# Patient Record
Sex: FEMALE | Race: WHITE | Employment: FULL TIME | URBAN - METROPOLITAN AREA
[De-identification: names, ages, dates, MRNs, and addresses within clinical notes are randomized per-mention and may not be internally consistent; named-entity substitution may affect disease eponyms.]

---

## 2017-03-22 RX ORDER — CIPROFLOXACIN 250 MG/1
250 TABLET, FILM COATED ORAL 2 TIMES DAILY
Qty: 14 TABLET | Refills: 0 | Status: SHIPPED | OUTPATIENT
Start: 2017-03-22 | End: 2017-03-29

## 2017-05-23 ENCOUNTER — TELEPHONE (OUTPATIENT)
Dept: INTERNAL MEDICINE CLINIC | Age: 27
End: 2017-05-23

## 2017-05-23 RX ORDER — AZITHROMYCIN 250 MG/1
TABLET, FILM COATED ORAL
Qty: 1 PACKET | Refills: 0 | Status: SHIPPED | OUTPATIENT
Start: 2017-05-23 | End: 2017-06-02

## 2017-09-08 ENCOUNTER — TELEPHONE (OUTPATIENT)
Dept: INTERNAL MEDICINE CLINIC | Age: 27
End: 2017-09-08

## 2017-09-08 RX ORDER — AZITHROMYCIN 250 MG/1
TABLET, FILM COATED ORAL
Qty: 1 PACKET | Refills: 0 | Status: SHIPPED | OUTPATIENT
Start: 2017-09-08 | End: 2017-09-18

## 2017-09-26 DIAGNOSIS — Z23 NEED FOR PROPHYLACTIC VACCINATION WITH MEASLES-MUMPS-RUBELLA (MMR) VACCINE: Primary | ICD-10-CM

## 2017-10-20 DIAGNOSIS — L65.9 HAIR LOSS: ICD-10-CM

## 2017-10-20 DIAGNOSIS — E07.9 THYROID DISORDER: ICD-10-CM

## 2017-10-20 DIAGNOSIS — Z02.0 SCHOOL PHYSICAL EXAM: Primary | ICD-10-CM

## 2017-10-24 PROCEDURE — 90686 IIV4 VACC NO PRSV 0.5 ML IM: CPT | Performed by: INTERNAL MEDICINE

## 2017-10-24 PROCEDURE — 90472 IMMUNIZATION ADMIN EACH ADD: CPT | Performed by: INTERNAL MEDICINE

## 2017-10-24 PROCEDURE — 90715 TDAP VACCINE 7 YRS/> IM: CPT | Performed by: INTERNAL MEDICINE

## 2017-10-24 PROCEDURE — 90471 IMMUNIZATION ADMIN: CPT | Performed by: INTERNAL MEDICINE

## 2017-10-24 PROCEDURE — 90710 MMRV VACCINE SC: CPT | Performed by: INTERNAL MEDICINE

## 2017-10-26 ENCOUNTER — HOSPITAL ENCOUNTER (OUTPATIENT)
Age: 27
Setting detail: SPECIMEN
Discharge: HOME | End: 2017-10-26
Payer: COMMERCIAL

## 2017-10-26 DIAGNOSIS — L65.9 HAIR LOSS: ICD-10-CM

## 2017-10-26 DIAGNOSIS — Z02.0 SCHOOL PHYSICAL EXAM: ICD-10-CM

## 2017-10-26 DIAGNOSIS — E07.9 THYROID DISORDER: ICD-10-CM

## 2017-10-26 LAB
ABSOLUTE EOS #: 0.2 K/UL (ref 0–0.4)
ABSOLUTE IMMATURE GRANULOCYTE: NORMAL K/UL (ref 0–0.3)
ABSOLUTE LYMPH #: 1.9 K/UL (ref 1–4.8)
ABSOLUTE MONO #: 1.2 K/UL (ref 0.1–1.2)
BASOPHILS # BLD: 0 %
BASOPHILS ABSOLUTE: 0 K/UL (ref 0–0.2)
DIFFERENTIAL TYPE: NORMAL
EOSINOPHILS RELATIVE PERCENT: 2 %
HCT VFR BLD CALC: 41.9 % (ref 36–46)
HEMOGLOBIN: 14 G/DL (ref 12–16)
HEPATITIS B SURFACE ANTIGEN: NONREACTIVE
IMMATURE GRANULOCYTES: NORMAL %
LYMPHOCYTES # BLD: 19 %
MCH RBC QN AUTO: 31.3 PG (ref 26–34)
MCHC RBC AUTO-ENTMCNC: 33.3 G/DL (ref 31–37)
MCV RBC AUTO: 94 FL (ref 80–100)
MONOCYTES # BLD: 12 %
PDW BLD-RTO: 13.4 % (ref 12.5–15.4)
PLATELET # BLD: 295 K/UL (ref 140–450)
PLATELET ESTIMATE: NORMAL
PMV BLD AUTO: 10.2 FL (ref 6–12)
RBC # BLD: 4.46 M/UL (ref 4–5.2)
RBC # BLD: NORMAL 10*6/UL
SEG NEUTROPHILS: 67 %
SEGMENTED NEUTROPHILS ABSOLUTE COUNT: 6.6 K/UL (ref 1.8–7.7)
TSH SERPL DL<=0.05 MIU/L-ACNC: 1.05 MIU/L (ref 0.3–5)
WBC # BLD: 9.9 K/UL (ref 3.5–11)
WBC # BLD: NORMAL 10*3/UL

## 2017-10-27 DIAGNOSIS — Z23 NEED FOR HEPATITIS VACCINATION: ICD-10-CM

## 2017-10-27 DIAGNOSIS — Z02.0 SCHOOL PHYSICAL EXAM: Primary | ICD-10-CM

## 2017-10-27 PROCEDURE — 90471 IMMUNIZATION ADMIN: CPT | Performed by: INTERNAL MEDICINE

## 2017-10-27 PROCEDURE — 90746 HEPB VACCINE 3 DOSE ADULT IM: CPT | Performed by: INTERNAL MEDICINE

## 2017-12-04 PROCEDURE — 90471 IMMUNIZATION ADMIN: CPT | Performed by: INTERNAL MEDICINE

## 2017-12-04 PROCEDURE — 90746 HEPB VACCINE 3 DOSE ADULT IM: CPT | Performed by: INTERNAL MEDICINE

## 2018-01-09 RX ORDER — OSELTAMIVIR PHOSPHATE 75 MG/1
75 CAPSULE ORAL DAILY
Qty: 10 CAPSULE | Refills: 0 | Status: SHIPPED | OUTPATIENT
Start: 2018-01-09 | End: 2018-01-19

## 2018-01-17 ENCOUNTER — TELEPHONE (OUTPATIENT)
Dept: INTERNAL MEDICINE CLINIC | Age: 28
End: 2018-01-17

## 2018-01-17 RX ORDER — PERMETHRIN 50 MG/G
CREAM TOPICAL
Qty: 1 TUBE | Refills: 0 | Status: SHIPPED | OUTPATIENT
Start: 2018-01-17 | End: 2018-06-06

## 2018-01-25 RX ORDER — MULTIVIT 47/IRON/FOLATE 1/DHA 27-1-300MG
1 CAPSULE ORAL DAILY
Qty: 90 CAPSULE | Refills: 3 | Status: SHIPPED | OUTPATIENT
Start: 2018-01-25 | End: 2018-06-06

## 2018-02-22 DIAGNOSIS — R41.840 DIFFICULTY CONCENTRATING: Primary | ICD-10-CM

## 2018-05-25 ENCOUNTER — HOSPITAL ENCOUNTER (EMERGENCY)
Age: 28
Discharge: HOME | End: 2018-05-25
Attending: EMERGENCY MEDICINE
Payer: COMMERCIAL

## 2018-05-25 ENCOUNTER — APPOINTMENT (OUTPATIENT)
Dept: ULTRASOUND IMAGING | Age: 28
End: 2018-05-25
Payer: COMMERCIAL

## 2018-05-25 ENCOUNTER — APPOINTMENT (OUTPATIENT)
Dept: CT IMAGING | Age: 28
End: 2018-05-25
Payer: COMMERCIAL

## 2018-05-25 VITALS
DIASTOLIC BLOOD PRESSURE: 64 MMHG | SYSTOLIC BLOOD PRESSURE: 120 MMHG | BODY MASS INDEX: 33.42 KG/M2 | HEART RATE: 88 BPM | WEIGHT: 177 LBS | HEIGHT: 61 IN | TEMPERATURE: 98.1 F | RESPIRATION RATE: 16 BRPM | OXYGEN SATURATION: 99 %

## 2018-05-25 DIAGNOSIS — N83.201 CYST OF RIGHT OVARY: Primary | ICD-10-CM

## 2018-05-25 DIAGNOSIS — R10.2 FEMALE PELVIC PAIN: ICD-10-CM

## 2018-05-25 DIAGNOSIS — R52 PAIN: ICD-10-CM

## 2018-05-25 LAB
ABSOLUTE EOS #: 0.2 K/UL (ref 0–0.4)
ABSOLUTE IMMATURE GRANULOCYTE: ABNORMAL K/UL (ref 0–0.3)
ABSOLUTE LYMPH #: 1.6 K/UL (ref 1–4.8)
ABSOLUTE MONO #: 1 K/UL (ref 0.1–1.3)
ANION GAP SERPL CALCULATED.3IONS-SCNC: 18 MMOL/L (ref 9–17)
BASOPHILS # BLD: 0 % (ref 0–2)
BASOPHILS ABSOLUTE: 0 K/UL (ref 0–0.2)
BILIRUBIN URINE: NEGATIVE
BUN BLDV-MCNC: 9 MG/DL (ref 6–20)
BUN/CREAT BLD: ABNORMAL (ref 9–20)
CALCIUM SERPL-MCNC: 9 MG/DL (ref 8.6–10.4)
CHLORIDE BLD-SCNC: 99 MMOL/L (ref 98–107)
CO2: 23 MMOL/L (ref 20–31)
COLOR: YELLOW
COMMENT UA: NORMAL
CREAT SERPL-MCNC: 0.76 MG/DL (ref 0.5–0.9)
DIFFERENTIAL TYPE: ABNORMAL
DIRECT EXAM: NORMAL
EOSINOPHILS RELATIVE PERCENT: 2 % (ref 0–4)
GFR AFRICAN AMERICAN: >60 ML/MIN
GFR NON-AFRICAN AMERICAN: >60 ML/MIN
GFR SERPL CREATININE-BSD FRML MDRD: ABNORMAL ML/MIN/{1.73_M2}
GFR SERPL CREATININE-BSD FRML MDRD: ABNORMAL ML/MIN/{1.73_M2}
GLUCOSE BLD-MCNC: 95 MG/DL (ref 70–99)
GLUCOSE URINE: NEGATIVE
HCG(URINE) PREGNANCY TEST: NEGATIVE
HCT VFR BLD CALC: 46.3 % (ref 36–46)
HEMOGLOBIN: 16 G/DL (ref 12–16)
IMMATURE GRANULOCYTES: ABNORMAL %
KETONES, URINE: NEGATIVE
LEUKOCYTE ESTERASE, URINE: NEGATIVE
LYMPHOCYTES # BLD: 15 % (ref 24–44)
Lab: NORMAL
MCH RBC QN AUTO: 33.1 PG (ref 26–34)
MCHC RBC AUTO-ENTMCNC: 34.6 G/DL (ref 31–37)
MCV RBC AUTO: 95.5 FL (ref 80–100)
MONOCYTES # BLD: 10 % (ref 1–7)
NITRITE, URINE: NEGATIVE
NRBC AUTOMATED: ABNORMAL PER 100 WBC
PDW BLD-RTO: 12.8 % (ref 11.5–14.9)
PH UA: 6 (ref 5–8)
PLATELET # BLD: 232 K/UL (ref 150–450)
PLATELET ESTIMATE: ABNORMAL
PMV BLD AUTO: 10.3 FL (ref 6–12)
POTASSIUM SERPL-SCNC: 3.7 MMOL/L (ref 3.7–5.3)
PROTEIN UA: NEGATIVE
RBC # BLD: 4.85 M/UL (ref 4–5.2)
RBC # BLD: ABNORMAL 10*6/UL
SEG NEUTROPHILS: 73 % (ref 36–66)
SEGMENTED NEUTROPHILS ABSOLUTE COUNT: 7.7 K/UL (ref 1.3–9.1)
SODIUM BLD-SCNC: 140 MMOL/L (ref 135–144)
SPECIFIC GRAVITY UA: 1 (ref 1–1.03)
SPECIMEN DESCRIPTION: NORMAL
SPECIMEN DESCRIPTION: NORMAL
STATUS: NORMAL
TURBIDITY: CLEAR
URINE HGB: NEGATIVE
UROBILINOGEN, URINE: NORMAL
WBC # BLD: 10.5 K/UL (ref 3.5–11)
WBC # BLD: ABNORMAL 10*3/UL

## 2018-05-25 PROCEDURE — 74176 CT ABD & PELVIS W/O CONTRAST: CPT

## 2018-05-25 PROCEDURE — 6360000002 HC RX W HCPCS: Performed by: EMERGENCY MEDICINE

## 2018-05-25 PROCEDURE — 87491 CHLMYD TRACH DNA AMP PROBE: CPT

## 2018-05-25 PROCEDURE — 96374 THER/PROPH/DIAG INJ IV PUSH: CPT

## 2018-05-25 PROCEDURE — 81003 URINALYSIS AUTO W/O SCOPE: CPT

## 2018-05-25 PROCEDURE — 87660 TRICHOMONAS VAGIN DIR PROBE: CPT

## 2018-05-25 PROCEDURE — 36415 COLL VENOUS BLD VENIPUNCTURE: CPT

## 2018-05-25 PROCEDURE — 99284 EMERGENCY DEPT VISIT MOD MDM: CPT

## 2018-05-25 PROCEDURE — 2580000003 HC RX 258: Performed by: EMERGENCY MEDICINE

## 2018-05-25 PROCEDURE — 87480 CANDIDA DNA DIR PROBE: CPT

## 2018-05-25 PROCEDURE — 84703 CHORIONIC GONADOTROPIN ASSAY: CPT

## 2018-05-25 PROCEDURE — 76830 TRANSVAGINAL US NON-OB: CPT

## 2018-05-25 PROCEDURE — 85025 COMPLETE CBC W/AUTO DIFF WBC: CPT

## 2018-05-25 PROCEDURE — 87591 N.GONORRHOEAE DNA AMP PROB: CPT

## 2018-05-25 PROCEDURE — 80048 BASIC METABOLIC PNL TOTAL CA: CPT

## 2018-05-25 PROCEDURE — 87510 GARDNER VAG DNA DIR PROBE: CPT

## 2018-05-25 PROCEDURE — 93976 VASCULAR STUDY: CPT

## 2018-05-25 RX ORDER — IBUPROFEN 600 MG/1
600 TABLET ORAL EVERY 6 HOURS PRN
Qty: 120 TABLET | Refills: 0 | Status: SHIPPED | OUTPATIENT
Start: 2018-05-25 | End: 2018-08-28

## 2018-05-25 RX ORDER — KETOROLAC TROMETHAMINE 30 MG/ML
30 INJECTION, SOLUTION INTRAMUSCULAR; INTRAVENOUS ONCE
Status: COMPLETED | OUTPATIENT
Start: 2018-05-25 | End: 2018-05-25

## 2018-05-25 RX ORDER — ACETAMINOPHEN AND CODEINE PHOSPHATE 300; 30 MG/1; MG/1
1 TABLET ORAL EVERY 4 HOURS PRN
Qty: 18 TABLET | Refills: 0 | Status: SHIPPED | OUTPATIENT
Start: 2018-05-25 | End: 2018-05-28

## 2018-05-25 RX ORDER — 0.9 % SODIUM CHLORIDE 0.9 %
1000 INTRAVENOUS SOLUTION INTRAVENOUS ONCE
Status: COMPLETED | OUTPATIENT
Start: 2018-05-25 | End: 2018-05-25

## 2018-05-25 RX ADMIN — KETOROLAC TROMETHAMINE 30 MG: 30 INJECTION, SOLUTION INTRAMUSCULAR; INTRAVENOUS at 06:34

## 2018-05-25 RX ADMIN — SODIUM CHLORIDE 1000 ML: 9 INJECTION, SOLUTION INTRAVENOUS at 03:05

## 2018-05-25 ASSESSMENT — PAIN SCALES - GENERAL
PAINLEVEL_OUTOF10: 10
PAINLEVEL_OUTOF10: 10

## 2018-05-29 LAB
C TRACH DNA GENITAL QL NAA+PROBE: NEGATIVE
N. GONORRHOEAE DNA: NEGATIVE

## 2018-06-04 ENCOUNTER — OFFICE VISIT (OUTPATIENT)
Dept: OBGYN CLINIC | Age: 28
End: 2018-06-04
Payer: COMMERCIAL

## 2018-06-04 VITALS
SYSTOLIC BLOOD PRESSURE: 115 MMHG | WEIGHT: 178 LBS | HEIGHT: 61 IN | BODY MASS INDEX: 33.61 KG/M2 | DIASTOLIC BLOOD PRESSURE: 83 MMHG | HEART RATE: 84 BPM

## 2018-06-04 DIAGNOSIS — N83.201 RIGHT OVARIAN CYST: Primary | ICD-10-CM

## 2018-06-04 PROCEDURE — 99212 OFFICE O/P EST SF 10 MIN: CPT | Performed by: SPECIALIST

## 2018-06-04 ASSESSMENT — ENCOUNTER SYMPTOMS
ABDOMINAL PAIN: 0
DIARRHEA: 0
CONSTIPATION: 0
APNEA: 0
COUGH: 0
VOMITING: 0
ABDOMINAL DISTENTION: 0
EYE PAIN: 0
NAUSEA: 0

## 2018-06-06 ENCOUNTER — HOSPITAL ENCOUNTER (OUTPATIENT)
Age: 28
Setting detail: SPECIMEN
Discharge: HOME | End: 2018-06-06
Payer: COMMERCIAL

## 2018-06-06 DIAGNOSIS — Z13.6 SCREENING FOR CARDIOVASCULAR CONDITION: ICD-10-CM

## 2018-06-06 DIAGNOSIS — K76.0 FATTY LIVER: ICD-10-CM

## 2018-06-06 PROBLEM — N83.201 CYST OF RIGHT OVARY: Status: ACTIVE | Noted: 2018-06-06

## 2018-06-06 LAB
ALBUMIN SERPL-MCNC: 3.9 G/DL (ref 3.5–5.2)
ALBUMIN/GLOBULIN RATIO: 1.4 (ref 1–2.5)
ALP BLD-CCNC: 70 U/L (ref 35–104)
ALT SERPL-CCNC: 65 U/L (ref 5–33)
AST SERPL-CCNC: 43 U/L
BILIRUB SERPL-MCNC: 0.65 MG/DL (ref 0.3–1.2)
BILIRUBIN DIRECT: 0.18 MG/DL
BILIRUBIN, INDIRECT: 0.47 MG/DL (ref 0–1)
CHOLESTEROL/HDL RATIO: 2.6
CHOLESTEROL: 157 MG/DL
GLOBULIN: ABNORMAL G/DL (ref 1.5–3.8)
HDLC SERPL-MCNC: 61 MG/DL
LDL CHOLESTEROL: 79 MG/DL (ref 0–130)
TOTAL PROTEIN: 6.6 G/DL (ref 6.4–8.3)
TRIGL SERPL-MCNC: 83 MG/DL
VLDLC SERPL CALC-MCNC: NORMAL MG/DL (ref 1–30)

## 2018-06-16 ENCOUNTER — HOSPITAL ENCOUNTER (OUTPATIENT)
Dept: ULTRASOUND IMAGING | Age: 28
Discharge: HOME | End: 2018-06-18
Payer: COMMERCIAL

## 2018-06-16 DIAGNOSIS — N28.9 RENAL LESION: ICD-10-CM

## 2018-06-16 DIAGNOSIS — K76.0 FATTY LIVER: ICD-10-CM

## 2018-06-16 PROCEDURE — 76770 US EXAM ABDO BACK WALL COMP: CPT

## 2018-06-16 PROCEDURE — 76705 ECHO EXAM OF ABDOMEN: CPT

## 2018-07-22 DIAGNOSIS — R10.9 ABDOMINAL CRAMPS: Primary | ICD-10-CM

## 2018-07-22 RX ORDER — DICYCLOMINE HCL 20 MG
20 TABLET ORAL 3 TIMES DAILY PRN
Qty: 30 TABLET | Refills: 1 | Status: SHIPPED | OUTPATIENT
Start: 2018-07-22 | End: 2018-10-18

## 2018-10-10 ENCOUNTER — HOSPITAL ENCOUNTER (OUTPATIENT)
Age: 28
Setting detail: SPECIMEN
Discharge: HOME | End: 2018-10-10
Payer: COMMERCIAL

## 2018-10-10 DIAGNOSIS — Z11.1 SCREENING-PULMONARY TB: ICD-10-CM

## 2018-10-10 DIAGNOSIS — R79.89 ELEVATED LFTS: ICD-10-CM

## 2018-10-10 LAB
ALBUMIN SERPL-MCNC: 4.3 G/DL (ref 3.5–5.2)
ALBUMIN/GLOBULIN RATIO: 1.4 (ref 1–2.5)
ALP BLD-CCNC: 71 U/L (ref 35–104)
ALT SERPL-CCNC: 87 U/L (ref 5–33)
AST SERPL-CCNC: 104 U/L
BILIRUB SERPL-MCNC: 0.25 MG/DL (ref 0.3–1.2)
BILIRUBIN DIRECT: 0.1 MG/DL
BILIRUBIN, INDIRECT: 0.15 MG/DL (ref 0–1)
GLOBULIN: ABNORMAL G/DL (ref 1.5–3.8)
IRON: 79 UG/DL (ref 37–145)
TOTAL PROTEIN: 7.3 G/DL (ref 6.4–8.3)

## 2018-10-15 LAB — T-SPOT TB TEST: NORMAL

## 2018-10-26 ENCOUNTER — TELEPHONE (OUTPATIENT)
Dept: INTERNAL MEDICINE CLINIC | Age: 28
End: 2018-10-26

## 2018-10-26 DIAGNOSIS — Z23 NEED FOR VACCINATION: Primary | ICD-10-CM

## 2018-11-05 ENCOUNTER — TELEPHONE (OUTPATIENT)
Dept: GASTROENTEROLOGY | Age: 28
End: 2018-11-05

## 2018-11-20 ENCOUNTER — TELEPHONE (OUTPATIENT)
Dept: OBGYN CLINIC | Age: 28
End: 2018-11-20

## 2018-11-21 RX ORDER — METRONIDAZOLE 500 MG/1
500 TABLET ORAL 2 TIMES DAILY
Qty: 14 TABLET | Refills: 0 | Status: SHIPPED | OUTPATIENT
Start: 2018-11-21 | End: 2018-11-28

## 2018-11-21 RX ORDER — FLUCONAZOLE 100 MG/1
100 TABLET ORAL DAILY
Qty: 7 TABLET | Refills: 0 | Status: SHIPPED | OUTPATIENT
Start: 2018-11-21 | End: 2018-11-28

## 2019-01-17 ENCOUNTER — OFFICE VISIT (OUTPATIENT)
Dept: GASTROENTEROLOGY | Age: 29
End: 2019-01-17
Payer: COMMERCIAL

## 2019-01-17 ENCOUNTER — TELEPHONE (OUTPATIENT)
Dept: GASTROENTEROLOGY | Age: 29
End: 2019-01-17

## 2019-01-17 VITALS
WEIGHT: 151 LBS | DIASTOLIC BLOOD PRESSURE: 62 MMHG | SYSTOLIC BLOOD PRESSURE: 108 MMHG | BODY MASS INDEX: 28.55 KG/M2 | HEART RATE: 96 BPM

## 2019-01-17 DIAGNOSIS — R79.89 ABNORMAL LFTS: Primary | ICD-10-CM

## 2019-01-17 PROCEDURE — 99203 OFFICE O/P NEW LOW 30 MIN: CPT | Performed by: INTERNAL MEDICINE

## 2019-01-17 ASSESSMENT — ENCOUNTER SYMPTOMS
EYES NEGATIVE: 1
RESPIRATORY NEGATIVE: 1
GASTROINTESTINAL NEGATIVE: 1
ALLERGIC/IMMUNOLOGIC NEGATIVE: 1

## 2019-01-21 ENCOUNTER — OFFICE VISIT (OUTPATIENT)
Dept: OBGYN CLINIC | Age: 29
End: 2019-01-21
Payer: COMMERCIAL

## 2019-01-21 ENCOUNTER — HOSPITAL ENCOUNTER (OUTPATIENT)
Age: 29
Setting detail: SPECIMEN
Discharge: HOME | End: 2019-01-21
Payer: COMMERCIAL

## 2019-01-21 VITALS
DIASTOLIC BLOOD PRESSURE: 72 MMHG | HEIGHT: 62 IN | SYSTOLIC BLOOD PRESSURE: 122 MMHG | HEART RATE: 106 BPM | RESPIRATION RATE: 18 BRPM | WEIGHT: 148 LBS | BODY MASS INDEX: 27.23 KG/M2

## 2019-01-21 DIAGNOSIS — N76.0 ACUTE VAGINITIS: ICD-10-CM

## 2019-01-21 DIAGNOSIS — Z01.419 WELL WOMAN EXAM: Primary | ICD-10-CM

## 2019-01-21 DIAGNOSIS — Z01.419 WELL WOMAN EXAM: ICD-10-CM

## 2019-01-21 PROCEDURE — 99395 PREV VISIT EST AGE 18-39: CPT | Performed by: SPECIALIST

## 2019-01-21 RX ORDER — FLUCONAZOLE 150 MG/1
150 TABLET ORAL ONCE
Qty: 1 TABLET | Refills: 1 | Status: SHIPPED | OUTPATIENT
Start: 2019-01-21 | End: 2019-01-21

## 2019-01-21 RX ORDER — METRONIDAZOLE 500 MG/1
2000 TABLET ORAL ONCE
Qty: 4 TABLET | Refills: 1 | Status: SHIPPED | OUTPATIENT
Start: 2019-01-21 | End: 2019-01-21

## 2019-01-21 ASSESSMENT — ENCOUNTER SYMPTOMS
EYE PAIN: 0
ABDOMINAL PAIN: 0
NAUSEA: 0
APNEA: 0
COUGH: 0
CONSTIPATION: 0
DIARRHEA: 0
VOMITING: 0
ABDOMINAL DISTENTION: 0

## 2019-01-22 LAB
C TRACH DNA GENITAL QL NAA+PROBE: NEGATIVE
HPV SAMPLE: NORMAL
HPV SOURCE: NORMAL
HPV, GENOTYPE 16: NOT DETECTED
HPV, GENOTYPE 18: NOT DETECTED
HPV, HIGH RISK OTHER: NOT DETECTED
HPV, INTERPRETATION: NORMAL
N. GONORRHOEAE DNA: NEGATIVE

## 2019-02-04 LAB — CYTOLOGY REPORT: NORMAL

## 2019-02-13 ENCOUNTER — HOSPITAL ENCOUNTER (OUTPATIENT)
Age: 29
Discharge: HOME | End: 2019-02-13
Payer: COMMERCIAL

## 2019-02-13 DIAGNOSIS — R79.89 ABNORMAL LFTS: ICD-10-CM

## 2019-02-13 LAB
ABSOLUTE EOS #: 0.21 K/UL (ref 0–0.44)
ABSOLUTE IMMATURE GRANULOCYTE: 0.03 K/UL (ref 0–0.3)
ABSOLUTE LYMPH #: 1.97 K/UL (ref 1.1–3.7)
ABSOLUTE MONO #: 0.81 K/UL (ref 0.1–1.2)
AFP: 2.6 UG/L
ALBUMIN SERPL-MCNC: 4.5 G/DL (ref 3.5–5.2)
ALBUMIN/GLOBULIN RATIO: 1.6 (ref 1–2.5)
ALP BLD-CCNC: 57 U/L (ref 35–104)
ALPHA-1 ANTITRYPSIN: 129 MG/DL (ref 90–200)
ALT SERPL-CCNC: 16 U/L (ref 5–33)
ANION GAP SERPL CALCULATED.3IONS-SCNC: 11 MMOL/L (ref 9–17)
AST SERPL-CCNC: 18 U/L
BASOPHILS # BLD: 1 % (ref 0–2)
BASOPHILS ABSOLUTE: 0.04 K/UL (ref 0–0.2)
BILIRUB SERPL-MCNC: 0.43 MG/DL (ref 0.3–1.2)
BILIRUBIN DIRECT: 0.12 MG/DL
BILIRUBIN, INDIRECT: 0.31 MG/DL (ref 0–1)
BUN BLDV-MCNC: 9 MG/DL (ref 6–20)
BUN/CREAT BLD: NORMAL (ref 9–20)
CALCIUM SERPL-MCNC: 9.3 MG/DL (ref 8.6–10.4)
CERULOPLASMIN: 25 MG/DL (ref 16–45)
CHLORIDE BLD-SCNC: 102 MMOL/L (ref 98–107)
CO2: 26 MMOL/L (ref 20–31)
CREAT SERPL-MCNC: 0.66 MG/DL (ref 0.5–0.9)
DIFFERENTIAL TYPE: NORMAL
EOSINOPHILS RELATIVE PERCENT: 3 % (ref 1–4)
FOLATE: 8.8 NG/ML
GFR AFRICAN AMERICAN: >60 ML/MIN
GFR NON-AFRICAN AMERICAN: >60 ML/MIN
GFR SERPL CREATININE-BSD FRML MDRD: NORMAL ML/MIN/{1.73_M2}
GFR SERPL CREATININE-BSD FRML MDRD: NORMAL ML/MIN/{1.73_M2}
GLOBULIN: NORMAL G/DL (ref 1.5–3.8)
GLUCOSE BLD-MCNC: 82 MG/DL (ref 70–99)
HBV SURFACE AB TITR SER: <3.5 MIU/ML
HCT VFR BLD CALC: 43 % (ref 36.3–47.1)
HEMOGLOBIN: 14.5 G/DL (ref 11.9–15.1)
HEPATITIS C ANTIBODY: NONREACTIVE
IMMATURE GRANULOCYTES: 0 %
IRON SATURATION: 23 % (ref 20–55)
IRON: 77 UG/DL (ref 37–145)
LYMPHOCYTES # BLD: 24 % (ref 24–43)
MCH RBC QN AUTO: 33.3 PG (ref 25.2–33.5)
MCHC RBC AUTO-ENTMCNC: 33.7 G/DL (ref 28.4–34.8)
MCV RBC AUTO: 98.6 FL (ref 82.6–102.9)
MONOCYTES # BLD: 10 % (ref 3–12)
NRBC AUTOMATED: 0 PER 100 WBC
PDW BLD-RTO: 12.4 % (ref 11.8–14.4)
PLATELET # BLD: 290 K/UL (ref 138–453)
PLATELET ESTIMATE: NORMAL
PMV BLD AUTO: 11.8 FL (ref 8.1–13.5)
POTASSIUM SERPL-SCNC: 4 MMOL/L (ref 3.7–5.3)
RBC # BLD: 4.36 M/UL (ref 3.95–5.11)
RBC # BLD: NORMAL 10*6/UL
SEG NEUTROPHILS: 62 % (ref 36–65)
SEGMENTED NEUTROPHILS ABSOLUTE COUNT: 5.24 K/UL (ref 1.5–8.1)
SODIUM BLD-SCNC: 139 MMOL/L (ref 135–144)
TOTAL IRON BINDING CAPACITY: 331 UG/DL (ref 250–450)
TOTAL PROTEIN: 7.3 G/DL (ref 6.4–8.3)
TSH SERPL DL<=0.05 MIU/L-ACNC: 0.86 MIU/L (ref 0.3–5)
UNSATURATED IRON BINDING CAPACITY: 254 UG/DL (ref 112–347)
WBC # BLD: 8.3 K/UL (ref 3.5–11.3)
WBC # BLD: NORMAL 10*3/UL

## 2019-02-13 PROCEDURE — 86317 IMMUNOASSAY INFECTIOUS AGENT: CPT

## 2019-02-13 PROCEDURE — 83520 IMMUNOASSAY QUANT NOS NONAB: CPT

## 2019-02-13 PROCEDURE — 85025 COMPLETE CBC W/AUTO DIFF WBC: CPT

## 2019-02-13 PROCEDURE — 82103 ALPHA-1-ANTITRYPSIN TOTAL: CPT

## 2019-02-13 PROCEDURE — 81479 UNLISTED MOLECULAR PATHOLOGY: CPT

## 2019-02-13 PROCEDURE — 83516 IMMUNOASSAY NONANTIBODY: CPT

## 2019-02-13 PROCEDURE — 82390 ASSAY OF CERULOPLASMIN: CPT

## 2019-02-13 PROCEDURE — 82397 CHEMILUMINESCENT ASSAY: CPT

## 2019-02-13 PROCEDURE — 86140 C-REACTIVE PROTEIN: CPT

## 2019-02-13 PROCEDURE — 82105 ALPHA-FETOPROTEIN SERUM: CPT

## 2019-02-13 PROCEDURE — 86038 ANTINUCLEAR ANTIBODIES: CPT

## 2019-02-13 PROCEDURE — 82787 IGG 1 2 3 OR 4 EACH: CPT

## 2019-02-13 PROCEDURE — 83540 ASSAY OF IRON: CPT

## 2019-02-13 PROCEDURE — 88346 IMFLUOR 1ST 1ANTB STAIN PX: CPT

## 2019-02-13 PROCEDURE — 80048 BASIC METABOLIC PNL TOTAL CA: CPT

## 2019-02-13 PROCEDURE — 86376 MICROSOMAL ANTIBODY EACH: CPT

## 2019-02-13 PROCEDURE — 86255 FLUORESCENT ANTIBODY SCREEN: CPT

## 2019-02-13 PROCEDURE — 80076 HEPATIC FUNCTION PANEL: CPT

## 2019-02-13 PROCEDURE — 83550 IRON BINDING TEST: CPT

## 2019-02-13 PROCEDURE — 84443 ASSAY THYROID STIM HORMONE: CPT

## 2019-02-13 PROCEDURE — 88350 IMFLUOR EA ADDL 1ANTB STN PX: CPT

## 2019-02-13 PROCEDURE — 82746 ASSAY OF FOLIC ACID SERUM: CPT

## 2019-02-13 PROCEDURE — 86803 HEPATITIS C AB TEST: CPT

## 2019-02-13 PROCEDURE — 36415 COLL VENOUS BLD VENIPUNCTURE: CPT

## 2019-02-14 LAB
ANTI-NUCLEAR ANTIBODY (ANA): NEGATIVE
TISSUE TRANSGLUTAMINASE IGA: 0.9 U/ML

## 2019-02-15 ENCOUNTER — OFFICE VISIT (OUTPATIENT)
Dept: GASTROENTEROLOGY | Age: 29
End: 2019-02-15
Payer: COMMERCIAL

## 2019-02-15 VITALS
SYSTOLIC BLOOD PRESSURE: 122 MMHG | WEIGHT: 153.4 LBS | BODY MASS INDEX: 28.52 KG/M2 | DIASTOLIC BLOOD PRESSURE: 79 MMHG | HEART RATE: 90 BPM

## 2019-02-15 DIAGNOSIS — R79.89 ABNORMAL LFTS: Primary | ICD-10-CM

## 2019-02-15 LAB — SMOOTH MUSCLE ANTIBODY: NORMAL

## 2019-02-15 PROCEDURE — 99213 OFFICE O/P EST LOW 20 MIN: CPT | Performed by: INTERNAL MEDICINE

## 2019-02-16 LAB
IGG 1: 339 MG/DL (ref 240–1118)
IGG 2: 354 MG/DL (ref 124–549)
IGG 3: 86 MG/DL (ref 21–134)
IGG 4: 53 MG/DL (ref 1–123)
LIVER-KIDNEY MICROSOMAL AB: NORMAL
MITOCHONDRIAL ANTIBODY: 4.2 UNITS (ref 0–20)

## 2019-02-20 LAB — IBD PANEL: NORMAL

## 2019-11-04 ENCOUNTER — HOSPITAL ENCOUNTER (OUTPATIENT)
Age: 29
Setting detail: SPECIMEN
Discharge: HOME | End: 2019-11-04
Payer: COMMERCIAL

## 2019-11-04 DIAGNOSIS — L65.9 HAIR THINNING: ICD-10-CM

## 2019-11-04 DIAGNOSIS — Z11.1 TUBERCULOSIS SCREENING: ICD-10-CM

## 2019-11-04 DIAGNOSIS — R61 NIGHT SWEATS: ICD-10-CM

## 2019-11-04 LAB
T3 TOTAL: 128 NG/DL (ref 80–200)
THYROXINE, FREE: 1.06 NG/DL (ref 0.93–1.7)
TSH SERPL DL<=0.05 MIU/L-ACNC: 0.95 MIU/L (ref 0.3–5)

## 2019-11-07 LAB — T-SPOT TB TEST: NORMAL

## 2019-12-26 ENCOUNTER — HOSPITAL ENCOUNTER (OUTPATIENT)
Age: 29
Setting detail: SPECIMEN
Discharge: HOME | End: 2019-12-26
Payer: COMMERCIAL

## 2019-12-26 ENCOUNTER — OFFICE VISIT (OUTPATIENT)
Dept: OBGYN CLINIC | Age: 29
End: 2019-12-26
Payer: COMMERCIAL

## 2019-12-26 VITALS
WEIGHT: 173.6 LBS | BODY MASS INDEX: 32.77 KG/M2 | DIASTOLIC BLOOD PRESSURE: 81 MMHG | SYSTOLIC BLOOD PRESSURE: 122 MMHG | HEART RATE: 83 BPM | RESPIRATION RATE: 18 BRPM | HEIGHT: 61 IN

## 2019-12-26 DIAGNOSIS — Z11.3 SCREEN FOR STD (SEXUALLY TRANSMITTED DISEASE): ICD-10-CM

## 2019-12-26 DIAGNOSIS — N89.8 VAGINAL ODOR: ICD-10-CM

## 2019-12-26 DIAGNOSIS — N39.0 URINARY TRACT INFECTION WITHOUT HEMATURIA, SITE UNSPECIFIED: ICD-10-CM

## 2019-12-26 DIAGNOSIS — N76.0 ACUTE VAGINITIS: ICD-10-CM

## 2019-12-26 DIAGNOSIS — N89.8 VAGINAL DISCHARGE: ICD-10-CM

## 2019-12-26 DIAGNOSIS — N89.8 VAGINAL DISCHARGE: Primary | ICD-10-CM

## 2019-12-26 LAB
BILIRUBIN, POC: NEGATIVE
BLOOD URINE, POC: NEGATIVE
CLARITY, POC: CLEAR
COLOR, POC: NORMAL
DIRECT EXAM: ABNORMAL
GLUCOSE URINE, POC: NEGATIVE
KETONES, POC: NEGATIVE
LEUKOCYTE EST, POC: NORMAL
Lab: ABNORMAL
NITRITE, POC: NEGATIVE
PH, POC: 5
PROTEIN, POC: NEGATIVE
SPECIFIC GRAVITY, POC: 1.02
SPECIMEN DESCRIPTION: ABNORMAL
UROBILINOGEN, POC: NORMAL

## 2019-12-26 PROCEDURE — 81002 URINALYSIS NONAUTO W/O SCOPE: CPT | Performed by: CLINICAL NURSE SPECIALIST

## 2019-12-26 PROCEDURE — 99213 OFFICE O/P EST LOW 20 MIN: CPT | Performed by: CLINICAL NURSE SPECIALIST

## 2019-12-26 RX ORDER — FLUCONAZOLE 100 MG/1
100 TABLET ORAL DAILY
Qty: 7 TABLET | Refills: 0 | Status: SHIPPED | OUTPATIENT
Start: 2019-12-26 | End: 2020-01-02

## 2019-12-26 RX ORDER — METRONIDAZOLE 7.5 MG/G
GEL VAGINAL
Qty: 1 TUBE | Refills: 0 | Status: SHIPPED | OUTPATIENT
Start: 2019-12-26 | End: 2020-01-02

## 2019-12-26 ASSESSMENT — ENCOUNTER SYMPTOMS
ALLERGIC/IMMUNOLOGIC NEGATIVE: 1
EYES NEGATIVE: 1
GASTROINTESTINAL NEGATIVE: 1
RESPIRATORY NEGATIVE: 1

## 2019-12-28 LAB
C TRACH DNA GENITAL QL NAA+PROBE: NEGATIVE
N. GONORRHOEAE DNA: NEGATIVE
SPECIMEN DESCRIPTION: NORMAL

## 2020-04-21 ENCOUNTER — TELEPHONE (OUTPATIENT)
Dept: INTERNAL MEDICINE CLINIC | Age: 30
End: 2020-04-21

## 2020-04-21 NOTE — TELEPHONE ENCOUNTER
Pt called today to request copy of Immunization record to be picked up in office.  Record printed and pt notified its ready for

## 2020-10-21 ENCOUNTER — OFFICE VISIT (OUTPATIENT)
Dept: OBGYN CLINIC | Age: 30
End: 2020-10-21
Payer: COMMERCIAL

## 2020-10-21 ENCOUNTER — HOSPITAL ENCOUNTER (OUTPATIENT)
Age: 30
Setting detail: SPECIMEN
Discharge: HOME | End: 2020-10-21
Payer: COMMERCIAL

## 2020-10-21 VITALS
DIASTOLIC BLOOD PRESSURE: 61 MMHG | TEMPERATURE: 98.3 F | SYSTOLIC BLOOD PRESSURE: 92 MMHG | HEART RATE: 68 BPM | BODY MASS INDEX: 35.57 KG/M2 | WEIGHT: 188.4 LBS | HEIGHT: 61 IN

## 2020-10-21 LAB
CONTROL: ABNORMAL
PREGNANCY TEST URINE, POC: POSITIVE

## 2020-10-21 PROCEDURE — 99213 OFFICE O/P EST LOW 20 MIN: CPT | Performed by: CLINICAL NURSE SPECIALIST

## 2020-10-21 PROCEDURE — 36415 COLL VENOUS BLD VENIPUNCTURE: CPT | Performed by: CLINICAL NURSE SPECIALIST

## 2020-10-21 PROCEDURE — 81025 URINE PREGNANCY TEST: CPT | Performed by: CLINICAL NURSE SPECIALIST

## 2020-10-21 RX ORDER — VITAMIN A ACETATE, .BETA.-CAROTENE, ASCORBIC ACID, CHOLECALCIFEROL, .ALPHA.-TOCOPHEROL ACETATE, DL-, THIAMINE MONONITRATE, RIBOFLAVIN, NIACINAMIDE, PYRIDOXINE HYDROCHLORIDE, FOLIC ACID, CYANOCOBALAMIN, CALCIUM CARBONATE, FERROUS FUMARATE, ZINC OXIDE, AND CUPRIC OXIDE 2000; 2000; 120; 400; 22; 1.84; 3; 20; 10; 1; 12; 200; 27; 25; 2 [IU]/1; [IU]/1; MG/1; [IU]/1; MG/1; MG/1; MG/1; MG/1; MG/1; MG/1; UG/1; MG/1; MG/1; MG/1; MG/1
1 TABLET ORAL DAILY
Qty: 30 TABLET | Refills: 11 | Status: SHIPPED | OUTPATIENT
Start: 2020-10-21 | End: 2021-10-16

## 2020-10-21 RX ORDER — PROMETHAZINE HYDROCHLORIDE 25 MG/1
25 TABLET ORAL EVERY 8 HOURS PRN
Qty: 30 TABLET | Refills: 2 | Status: SHIPPED | OUTPATIENT
Start: 2020-10-21 | End: 2020-11-04

## 2020-10-21 NOTE — PROGRESS NOTES
Vag-Spont EPI N SHENG   1 Term  39w1d  9 lb 13.5 oz (4.465 kg) M Vag-Spont EPI  SHENG       ROS was done and is negative except as documented in HPI. History was reviewed as documented on Epic Navigator. ASSESSMENT:  Missed menses with + UCG  1. Amenorrhea    2. Missed menses    3. Pregnancy with inconclusive fetal viability, single or unspecified fetus    4. Positive pregnancy test        PLAN:  UCG was done and noted as positive  Prenatal vitamins were ordered: Yes  Ultrasound for dating and viability was ordered: Yes  1 hour glucola was ordered: Yes  She was instructed to call with any concerns or worsening of any symptoms  She will return for New OB intake visit  zika precautions reviewed    Patient was seen with total face to face time of 15 minutes. More than 50% of this visit was spent face to face coordinating plan of care and answering questions . She was also counseled on her preventative health maintenance recommendations and follow-up. Return for call in am for quant level if 5000 or above will schedule dating US.     Electronically signed by: Hayes Adam CNP

## 2020-10-22 LAB — HCG QUANTITATIVE: ABNORMAL IU/L

## 2020-11-04 ENCOUNTER — HOSPITAL ENCOUNTER (OUTPATIENT)
Age: 30
Setting detail: SPECIMEN
Discharge: HOME | End: 2020-11-04
Payer: COMMERCIAL

## 2020-11-04 ENCOUNTER — INITIAL PRENATAL (OUTPATIENT)
Dept: OBGYN CLINIC | Age: 30
End: 2020-11-04

## 2020-11-04 VITALS
BODY MASS INDEX: 35.19 KG/M2 | TEMPERATURE: 98.4 F | HEART RATE: 83 BPM | WEIGHT: 186.4 LBS | SYSTOLIC BLOOD PRESSURE: 113 MMHG | DIASTOLIC BLOOD PRESSURE: 71 MMHG | HEIGHT: 61 IN

## 2020-11-04 LAB
AMPHETAMINE SCREEN URINE: NEGATIVE
BARBITURATE SCREEN URINE: NEGATIVE
BENZODIAZEPINE SCREEN, URINE: NEGATIVE
BILIRUBIN URINE: NEGATIVE
BUPRENORPHINE URINE: NORMAL
CANNABINOID SCREEN URINE: NEGATIVE
COCAINE METABOLITE, URINE: NEGATIVE
COLOR: YELLOW
COMMENT UA: ABNORMAL
GLUCOSE URINE: NEGATIVE
KETONES, URINE: ABNORMAL
LEUKOCYTE ESTERASE, URINE: NEGATIVE
MDMA URINE: NORMAL
METHADONE SCREEN, URINE: NEGATIVE
METHAMPHETAMINE, URINE: NORMAL
NITRITE, URINE: NEGATIVE
OPIATES, URINE: NEGATIVE
OXYCODONE SCREEN URINE: NEGATIVE
PH UA: 5.5 (ref 5–8)
PHENCYCLIDINE, URINE: NEGATIVE
PROPOXYPHENE, URINE: NORMAL
PROTEIN UA: NEGATIVE
SPECIFIC GRAVITY UA: 1.01 (ref 1–1.03)
TEST INFORMATION: NORMAL
TRICYCLIC ANTIDEPRESSANTS, UR: NORMAL
TURBIDITY: CLEAR
URINE HGB: NEGATIVE
UROBILINOGEN, URINE: NORMAL

## 2020-11-04 RX ORDER — BUTALBITAL, ACETAMINOPHEN, CAFFEINE AND CODEINE PHOSPHATE 50; 325; 40; 30 MG/1; MG/1; MG/1; MG/1
1 CAPSULE ORAL EVERY 4 HOURS PRN
Qty: 8 CAPSULE | Refills: 0 | Status: SHIPPED | OUTPATIENT
Start: 2020-11-04 | End: 2020-11-14

## 2020-11-04 NOTE — PROGRESS NOTES
Giulia Hernandes is a 31 y.o. female 11w6d, patient complains of headache unrelieved by tylenol, states it has been so bad it is causing her nausea, and she almost went to the ER last evening. [id number]    OB History    Para Term  AB Living   8 5 5   2 5   SAB TAB Ectopic Molar Multiple Live Births   2         5      # Outcome Date GA Lbr Sylvester/2nd Weight Sex Delivery Anes PTL Lv   8 Current            7 2019           6 Term 14 39w0d  9 lb 13 oz (4.451 kg) M Vag-Spont EPI N SHENG   5 Term 13 38w0d  7 lb 6 oz (3.345 kg) F Vag-Spont EPI N SHENG      Complications: Pulmonary hypertension (HCC)   4 Term 01/05/10 39w0d  6 lb 14 oz (3.118 kg) F Vag-Spont EPI N SHENG   3 2009           2 Term 10/18/07 39w0d  8 lb 7 oz (3.827 kg) F Vag-Spont EPI N SHENG   1 Term  39w1d  9 lb 13.5 oz (4.465 kg) M Vag-Spont EPI  SHENG       Blood pressure 113/71, pulse 83, temperature 98.4 °F (36.9 °C), temperature source Temporal, height 5' 1\" (1.549 m), weight 186 lb 6.4 oz (84.6 kg), not currently breastfeeding. The patient was seen and evaluated. There was N/A fetal movements. No contractions or leakage of fluid. Signs and symptoms of  labor as well as labor were reviewed. Dates were reviewed with the patient. The physical exam will be completed along with pap and cultures at next visit. Prenatal labs were drawn today. The patient will return to the office for her next visit in 4 weeks. See antepartum flow sheet.      Patient Active Problem List    Diagnosis Date Noted    Fatty liver 2018    Cyst of right ovary 2018    History of Thyroid Disorder 2014     No medications: TSH was monitored and found to be normal      History of pulmonary hypertension 2014     History of Pulmonary HTN in prior pregnancy- normal ECHO in  per patient      Pelvic pain  07/15/2014     Musculoskeletal. Pubic Symphysis strain  Flexeril and pregnancy support belt given 07/15/2014 Diagnosis Orders   1. Encounter for supervision of other normal pregnancy in first trimester     2. 11 weeks gestation of pregnancy     3. Acute nonintractable headache, unspecified headache type  butalbital-acetaminophen-caffeine-codeine (FIORICET WITH CODEINE) -25-30 MG per capsule    Continue prenatal vitamins, increase water intake and frequent rest periods as needed. Patient encouraged to stay hydrated and eat small frequent meals and to call office if headache is unrelieved by fioricet. 11w6d Prenatal history and OB education, including milestones throughout the pregnancy, vaccinations recommended while pregnant, any risk factors that may cause the patient to become high risk requiring  testing, and any viruses that may cause complications or fetal anomalies was completed. Total time spent with patient was 20 minutes face-to-face. Controlled Substance Monitoring:    Acute and Chronic Pain Monitoring:   RX Monitoring 2020   Attestation -   Periodic Controlled Substance Monitoring No signs of potential drug abuse or diversion identified.        Electronically signed by: Bruno Moseley CNP

## 2020-11-05 ENCOUNTER — TELEPHONE (OUTPATIENT)
Dept: OBGYN CLINIC | Age: 30
End: 2020-11-05

## 2020-11-05 LAB
ABO/RH: NORMAL
ABSOLUTE EOS #: 0.26 K/UL (ref 0–0.44)
ABSOLUTE IMMATURE GRANULOCYTE: 0.04 K/UL (ref 0–0.3)
ABSOLUTE LYMPH #: 1.59 K/UL (ref 1.1–3.7)
ABSOLUTE MONO #: 0.77 K/UL (ref 0.1–1.2)
ANTIBODY SCREEN: NEGATIVE
BASOPHILS # BLD: 0 % (ref 0–2)
BASOPHILS ABSOLUTE: 0.04 K/UL (ref 0–0.2)
DIFFERENTIAL TYPE: ABNORMAL
EOSINOPHILS RELATIVE PERCENT: 2 % (ref 1–4)
GLUCOSE ADMINISTRATION: ABNORMAL
GLUCOSE TOLERANCE SCREEN 50G: 137 MG/DL (ref 70–135)
HCT VFR BLD CALC: 36.5 % (ref 36.3–47.1)
HEMOGLOBIN: 11.8 G/DL (ref 11.9–15.1)
HEPATITIS B SURFACE ANTIGEN: NONREACTIVE
HIV AG/AB: NONREACTIVE
IMMATURE GRANULOCYTES: 0 %
LYMPHOCYTES # BLD: 15 % (ref 24–43)
MCH RBC QN AUTO: 32.1 PG (ref 25.2–33.5)
MCHC RBC AUTO-ENTMCNC: 32.3 G/DL (ref 28.4–34.8)
MCV RBC AUTO: 99.2 FL (ref 82.6–102.9)
MONOCYTES # BLD: 7 % (ref 3–12)
NRBC AUTOMATED: 0 PER 100 WBC
PDW BLD-RTO: 11.8 % (ref 11.8–14.4)
PLATELET # BLD: 282 K/UL (ref 138–453)
PLATELET ESTIMATE: ABNORMAL
PMV BLD AUTO: 12.3 FL (ref 8.1–13.5)
RBC # BLD: 3.68 M/UL (ref 3.95–5.11)
RBC # BLD: ABNORMAL 10*6/UL
RUBV IGG SER QL: 46.1 IU/ML
SEG NEUTROPHILS: 76 % (ref 36–65)
SEGMENTED NEUTROPHILS ABSOLUTE COUNT: 8 K/UL (ref 1.5–8.1)
SICKLE CELL SCREEN: NEGATIVE
T. PALLIDUM, IGG: NONREACTIVE
TSH SERPL DL<=0.05 MIU/L-ACNC: 0.01 MIU/L (ref 0.3–5)
WBC # BLD: 10.7 K/UL (ref 3.5–11.3)
WBC # BLD: ABNORMAL 10*3/UL

## 2020-11-05 NOTE — TELEPHONE ENCOUNTER
----- Message from KADEEM Brown CNP sent at 11/5/2020  7:50 AM EST -----  Please let the patient know that her GTT was slightly elevated and she will need to do a 3 hr and the order is placed, also her TSH is 0.01 and she needs to follow with endocrin she has had thyroid issues with a previous pregnancy but resolved after the pregnancy.

## 2020-11-11 LAB — CYSTIC FIBROSIS: NORMAL

## 2020-12-01 ENCOUNTER — ROUTINE PRENATAL (OUTPATIENT)
Dept: OBGYN CLINIC | Age: 30
End: 2020-12-01
Payer: COMMERCIAL

## 2020-12-01 ENCOUNTER — HOSPITAL ENCOUNTER (OUTPATIENT)
Age: 30
Setting detail: SPECIMEN
Discharge: HOME | End: 2020-12-01
Payer: COMMERCIAL

## 2020-12-01 VITALS
HEART RATE: 84 BPM | SYSTOLIC BLOOD PRESSURE: 104 MMHG | DIASTOLIC BLOOD PRESSURE: 68 MMHG | BODY MASS INDEX: 35.14 KG/M2 | WEIGHT: 186 LBS

## 2020-12-01 PROCEDURE — 99395 PREV VISIT EST AGE 18-39: CPT | Performed by: SPECIALIST

## 2020-12-01 PROCEDURE — 0500F INITIAL PRENATAL CARE VISIT: CPT | Performed by: SPECIALIST

## 2020-12-01 RX ORDER — BUTALBITAL, ACETAMINOPHEN AND CAFFEINE 50; 325; 40 MG/1; MG/1; MG/1
1 TABLET ORAL EVERY 4 HOURS PRN
Qty: 30 TABLET | Refills: 1 | Status: SHIPPED | OUTPATIENT
Start: 2020-12-01 | End: 2021-01-26

## 2020-12-01 NOTE — PROGRESS NOTES
Delio Landers is a 31 y.o. female 15w5d    [id number]    OB History    Para Term  AB Living   8 5 5   2 5   SAB TAB Ectopic Molar Multiple Live Births   2         5      # Outcome Date GA Lbr Sylvester/2nd Weight Sex Delivery Anes PTL Lv   8 Current            7 SAB            6 Term 14 39w0d  9 lb 13 oz (4.451 kg) M Vag-Spont EPI N SHENG   5 Term 13 38w0d  7 lb 6 oz (3.345 kg) F Vag-Spont EPI N SHENG      Complications: Pulmonary hypertension (HCC)   4 Term 01/05/10 39w0d  6 lb 14 oz (3.118 kg) F Vag-Spont EPI N SHENG   3 SAB            2 Term 10/18/07 39w0d  8 lb 7 oz (3.827 kg) F Vag-Spont EPI N SHENG   1 Term  39w1d  9 lb 13.5 oz (4.465 kg) M Vag-Spont EPI  SHENG       Chief Complaint   Patient presents with    Routine Prenatal Visit            Blood pressure 104/68, pulse 84, weight 186 lb (84.4 kg), not currently breastfeeding. The patient was seen and evaluated. There was N/A fetal movements. No contractions or leakage of fluid. Signs and symptoms of  labor as well as labor were reviewed. Dates were reviewed with the patient. The patient will return to the office for her next visit in 1 week. See antepartum flow sheet. GENERAL EXAM:  HEENT: Normal    Thyroid: Normal  Lymph Nodes: Normal  Neurological: Normal  Skin: Normal  Heart:Normal   Lungs: Normal   Breasts: Normal   Abdomen: Normal   Extremities: Normal   Musculoskeletal: Normal    PELVIC EXAM:  Vulva: Normal  Vagina: Normal  Cervix: Normal  Uterus: Enlarged 15 Gestational Weeks  Adnexa: Normal  Rectum: Normal  Spines: Average  Sacrum: Concave  Subpubic Arch: Normal  Diag.  Conjugate: Not Done  Length (cm): N/A  Pelvic Type: Gynecoid      Patient Active Problem List    Diagnosis Date Noted    Fatty liver 2018    Cyst of right ovary 2018    History of Thyroid Disorder 2014     No medications: TSH was monitored and found to be normal      History of pulmonary hypertension 2014     History of Pulmonary HTN in prior pregnancy- normal ECHO in 2014 per patient      Pelvic pain  07/15/2014     Musculoskeletal. Pubic Symphysis strain  Flexeril and pregnancy support belt given 07/15/2014            Diagnosis Orders   1. Prenatal care in second trimester  PAP SMEAR    C.trachomatis N.gonorrhoeae DNA   2. Pap smear, as part of routine gynecological examination  PAP SMEAR   3. Acute nonintractable headache, unspecified headache type       Orders Placed This Encounter   Medications    butalbital-acetaminophen-caffeine (FIORICET, ESGIC) -40 MG per tablet     Sig: Take 1 tablet by mouth every 4 hours as needed for Headaches     Dispense:  30 tablet     Refill:  1      Patient with low TSH. Patient states that in her third pregnancy her TSH was low, but when she saw Dr. Jude Bryant following the pregnancy it was normal.  TSH will be repeated in 2 weeks and patient was also advised to follow up with her family physician for further evaluation of this. Patient does not want a referral to Dr. Jude Bryant at this time. She complains of migraine headaches that start on the right side of her head and then encompass her whole head. It is so painful she cannot do anything. She denies history of migraine. She states that she has been taking Fioricet with Tylenol, but it only helps for about an hour. Patient was advised to rest in a dark room when she gets a headache. Explained that medication is limited in pregnancy and that Fioricet is a narcotic containing codeine. She was advised to use Tylenol and a prescription of Esgic will be sent to pharmacy. Patient states that she only has one Imitrex left and requests refill. Patient does state that she has history of pulmonary hypertension in previous pregnancy. Patient states that after her pregnancy, she was told she was fine and did not have this any longer. She will discuss her history of pulmonary hypertension as well.   Patient was advised that as her 1 hour glucose is elevated at 137, she needs to have 3 hour glucose testing done. Fetal heart rate auscultated at 144 bpm and fundal height was not measured today due to early gestational age. Pap and cultures done today. Patient advised to continue taking prenatal vitamins and to rest as necessary. Patient is working at a school. Barbie Milton am scribing for, and in the presence of Dr. Marge Santos. Electronically signed by: Lui Dorado 12/1/20 4:27 PM   I agree to the above documentation placed by my scribe Lui Dorado. I reviewed the scribe's note and agree with the documented findings and plan of care. Any areas of disagreement are noted on the chart. I have personally evaluated this patient. Additional findings are as noted. I agree with the chief complaint, past medical history, past surgical history, allergies, medications, social and family history as documented unless otherwise noted below.      Electronically signed by Marge Santos MD on 12/2/2020 at 2:13 AM

## 2020-12-07 ENCOUNTER — HOSPITAL ENCOUNTER (OUTPATIENT)
Dept: NON INVASIVE DIAGNOSTICS | Age: 30
Discharge: HOME | End: 2020-12-09
Payer: COMMERCIAL

## 2020-12-07 LAB
HPV SOURCE: NORMAL
HPV, GENOTYPE 16: NOT DETECTED
HPV, GENOTYPE 18: NOT DETECTED
HPV, HIGH RISK OTHER: NOT DETECTED
LV EF: 55 %
LVEF MODALITY: NORMAL

## 2020-12-07 PROCEDURE — 93306 TTE W/DOPPLER COMPLETE: CPT

## 2020-12-08 ENCOUNTER — HOSPITAL ENCOUNTER (OUTPATIENT)
Age: 30
Setting detail: SPECIMEN
Discharge: HOME | End: 2020-12-08
Payer: COMMERCIAL

## 2020-12-08 ENCOUNTER — ROUTINE PRENATAL (OUTPATIENT)
Dept: OBGYN CLINIC | Age: 30
End: 2020-12-08

## 2020-12-08 VITALS
HEART RATE: 86 BPM | DIASTOLIC BLOOD PRESSURE: 64 MMHG | SYSTOLIC BLOOD PRESSURE: 104 MMHG | WEIGHT: 186 LBS | BODY MASS INDEX: 35.14 KG/M2

## 2020-12-08 PROCEDURE — 0502F SUBSEQUENT PRENATAL CARE: CPT | Performed by: CLINICAL NURSE SPECIALIST

## 2020-12-08 NOTE — PROGRESS NOTES
Dayan Yuen is a 31 y.o. female 16w5d    [id number]    OB History    Para Term  AB Living   8 5 5   2 5   SAB TAB Ectopic Molar Multiple Live Births   2         5      # Outcome Date GA Lbr Sylvester/2nd Weight Sex Delivery Anes PTL Lv   8 Current            7 SAB            6 Term 14 39w0d  9 lb 13 oz (4.451 kg) M Vag-Spont EPI N SHENG   5 Term 13 38w0d  7 lb 6 oz (3.345 kg) F Vag-Spont EPI N SHENG      Complications: Pulmonary hypertension (HCC)   4 Term 01/05/10 39w0d  6 lb 14 oz (3.118 kg) F Vag-Spont EPI N SHENG   3 SAB            2 Term 10/18/07 39w0d  8 lb 7 oz (3.827 kg) F Vag-Spont EPI N SEHNG   1 Term  39w1d  9 lb 13.5 oz (4.465 kg) M Vag-Spont EPI  SHENG       Blood pressure 104/64, pulse 86, weight 186 lb (84.4 kg), not currently breastfeeding. The patient was seen and evaluated. There was Positive fetal movements. No contractions or leakage of fluid. Signs and symptoms of  labor as well as labor were reviewed. A Quad Screen was ordered for a 15-20 week gestational age window. Dates were reviewed with the patient. An 18-22 week anatomy ultrasound has been ordered. The patient will return to the office for her next visit in 1 weeks. See antepartum flow sheet. Patient Active Problem List    Diagnosis Date Noted    Fatty liver 2018    Cyst of right ovary 2018    History of Thyroid Disorder 2014     No medications: TSH was monitored and found to be normal      History of pulmonary hypertension 2014     History of Pulmonary HTN in prior pregnancy- normal ECHO in  per patient      Pelvic pain  07/15/2014     Musculoskeletal. Pubic Symphysis strain  Flexeril and pregnancy support belt given 07/15/2014            Diagnosis Orders   1. Encounter for supervision of high risk pregnancy due to advanced maternal age in primigravida     2.  Pulmonary hypertension ([address]  T-Spot TB Test    YASMANI - Melissa Joiner MD, Pulmonology, [place]   3. 16 weeks

## 2020-12-09 ENCOUNTER — TELEPHONE (OUTPATIENT)
Dept: OBGYN CLINIC | Age: 30
End: 2020-12-09

## 2020-12-09 LAB
CYTOLOGY REPORT: NORMAL
TSH SERPL DL<=0.05 MIU/L-ACNC: 0.2 MIU/L (ref 0.3–5)

## 2020-12-09 NOTE — TELEPHONE ENCOUNTER
----- Message from KADEEM Ramirez CNP sent at 12/9/2020  8:13 AM EST -----  Will redraw her TSH every 4 wks, please elliott on yellow card next draw is 1/

## 2020-12-11 LAB
AFP INTERPRETATION: NORMAL
AFP MOM: 0.97
AFP QUAD INTERPRETATION: NORMAL
AFP SPECIMEN: NORMAL
AFP: 30 NG/ML
DATE OF BIRTH: NORMAL
DATING METHOD: NORMAL
DETERMINED BY: NORMAL
DIABETIC: NEGATIVE
DIMERIC INHIBIN A: 249 PG/ML
DUE DATE: NORMAL
ESTIMATED DUE DATE: NORMAL
FAMILY HISTORY NTD: NEGATIVE
GESTATIONAL AGE: NORMAL
HISTORY OF ANEUPLOIDY?: NORMAL
IN VITRO FERTILIZATION: NORMAL
INHIBIN A MOM: 1.73
INSULIN REQ DIABETES: NO
LAST MENSTRUAL PERIOD: NORMAL
MATERNAL AGE AT EDD: 31.9 YR
MATERNAL WEIGHT: 186
MOM FOR HCG, 2ND TRIMESTER: 0.71
MONOCHORIONIC TWINS: NORMAL
NUMBER OF FETUSES: NORMAL
PATIENT WEIGHT UNITS: NORMAL
PATIENT WEIGHT: NORMAL
PATIENT'S HCG, TRI 2: NORMAL IU/L
PREV TRISOMY PREG: NORMAL
RACE (MATERNAL): NORMAL
RACE: NORMAL
REPEAT SPECIMEN?: NORMAL
SMOKING: NORMAL
SMOKING: NORMAL
UE3 MOM: 1.12
UE3 VALUE: 1.38 NG/ML
VALPROIC/CARBAMAZEP: NORMAL
ZZ NTE CLEAN UP: HISTORY: NO

## 2020-12-16 ENCOUNTER — ROUTINE PRENATAL (OUTPATIENT)
Dept: OBGYN CLINIC | Age: 30
End: 2020-12-16

## 2020-12-16 VITALS
SYSTOLIC BLOOD PRESSURE: 107 MMHG | BODY MASS INDEX: 35.67 KG/M2 | HEART RATE: 78 BPM | DIASTOLIC BLOOD PRESSURE: 62 MMHG | WEIGHT: 188.8 LBS | TEMPERATURE: 97.4 F

## 2020-12-16 PROBLEM — Z3A.17 17 WEEKS GESTATION OF PREGNANCY: Status: ACTIVE | Noted: 2020-12-16

## 2020-12-16 PROCEDURE — 0502F SUBSEQUENT PRENATAL CARE: CPT | Performed by: SPECIALIST

## 2020-12-16 ASSESSMENT — PATIENT HEALTH QUESTIONNAIRE - PHQ9
SUM OF ALL RESPONSES TO PHQ QUESTIONS 1-9: 0
SUM OF ALL RESPONSES TO PHQ9 QUESTIONS 1 & 2: 0
1. LITTLE INTEREST OR PLEASURE IN DOING THINGS: 0
2. FEELING DOWN, DEPRESSED OR HOPELESS: 0
SUM OF ALL RESPONSES TO PHQ QUESTIONS 1-9: 0
SUM OF ALL RESPONSES TO PHQ QUESTIONS 1-9: 0

## 2020-12-16 NOTE — PROGRESS NOTES
Quin Metzger is a 31 y.o. female 17w6d    [id number]    OB History    Para Term  AB Living   8 5 5   2 5   SAB TAB Ectopic Molar Multiple Live Births   2         5      # Outcome Date GA Lbr Sylvester/2nd Weight Sex Delivery Anes PTL Lv   8 Current            7 SAB            6 Term 14 39w0d  9 lb 13 oz (4.451 kg) M Vag-Spont EPI N SHENG   5 Term 13 38w0d  7 lb 6 oz (3.345 kg) F Vag-Spont EPI N SHENG      Complications: Pulmonary hypertension (HCC)   4 Term 01/05/10 39w0d  6 lb 14 oz (3.118 kg) F Vag-Spont EPI N SHENG   3 SAB            2 Term 10/18/07 39w0d  8 lb 7 oz (3.827 kg) F Vag-Spont EPI N SHENG   1 Term  39w1d  9 lb 13.5 oz (4.465 kg) M Vag-Spont EPI  SHENG       Chief Complaint   Patient presents with    Routine Prenatal Visit     Patient is 17 weeks and 6 days gestation and is here for supervision of high risk pregnancy.  High Risk Pregnancy            Blood pressure 107/62, pulse 78, temperature 97.4 °F (36.3 °C), temperature source Temporal, weight 188 lb 12.8 oz (85.6 kg), not currently breastfeeding. The patient was seen and evaluated. There was Positive fetal movements. No contractions or leakage of fluid. Signs and symptoms of  labor as well as labor were reviewed. Dates were reviewed with the patient. The patient will return to the office for her next visit in 1 week. See antepartum flow sheet.      Patient Active Problem List    Diagnosis Date Noted    17 weeks gestation of pregnancy 2020    Fatty liver 2018    Cyst of right ovary 2018    History of Thyroid Disorder 2014     No medications: TSH was monitored and found to be normal      History of pulmonary hypertension 2014     History of Pulmonary HTN in prior pregnancy- normal ECHO in  per patient      Pelvic pain  07/15/2014     Musculoskeletal. Pubic Symphysis strain  Flexeril and pregnancy support belt given 07/15/2014        High-risk pregnancy in second trimester 07/15/2014     Rh+/RI/GBS neg  Quad screen: WNL  Male          Diagnosis Orders   1. 17 weeks gestation of pregnancy     2. History of pulmonary hypertension     3. History of Thyroid Disorder     4. High-risk pregnancy in second trimester         Patient complains of shortness of breath on exertion. Patient was referred to a pulmonologist and MFM for history of pulmonary hypertension and low TSH. Patient states she has not been able to see her family physician because she was told to follow up with her OB. She has not scheduled an appointment with pulmonologist yet. Fetal heart rate auscultated at 132 bpm and fundal height was not measured today due to early gestational age. Patient advised to continue taking prenatal vitamins and to rest as necessary. Lynn Steele am scribing for, and in the presence of Dr. Dara Blackburn. Electronically signed by: Trung Hawley 12/16/20 4:24 PM    I agree to the above documentation placed by my scribe Trung Hawley. I reviewed the scribe's note and agree with the documented findings and plan of care. Any areas of disagreement are noted on the chart. I have personally evaluated this patient. Additional findings are as noted. I agree with the chief complaint, past medical history, past surgical history, allergies, medications, social and family history as documented unless otherwise noted below.      Electronically signed by Dara Blackburn MD on 12/17/2020 at 3:17 AM

## 2020-12-22 ENCOUNTER — ROUTINE PRENATAL (OUTPATIENT)
Dept: OBGYN CLINIC | Age: 30
End: 2020-12-22

## 2020-12-22 VITALS
SYSTOLIC BLOOD PRESSURE: 92 MMHG | DIASTOLIC BLOOD PRESSURE: 54 MMHG | HEART RATE: 79 BPM | WEIGHT: 189 LBS | BODY MASS INDEX: 35.71 KG/M2

## 2020-12-22 PROBLEM — Z3A.18 18 WEEKS GESTATION OF PREGNANCY: Status: ACTIVE | Noted: 2020-12-22

## 2020-12-22 PROCEDURE — 0502F SUBSEQUENT PRENATAL CARE: CPT | Performed by: SPECIALIST

## 2020-12-22 NOTE — PROGRESS NOTES
Marita Pierce is a 31 y.o. female 18w5d    [id number]    OB History    Para Term  AB Living   8 5 5   2 5   SAB TAB Ectopic Molar Multiple Live Births   2         5      # Outcome Date GA Lbr Sylvester/2nd Weight Sex Delivery Anes PTL Lv   8 Current            7 SAB            6 Term 14 39w0d  9 lb 13 oz (4.451 kg) M Vag-Spont EPI N SHENG   5 Term 13 38w0d  7 lb 6 oz (3.345 kg) F Vag-Spont EPI N SHENG      Complications: Pulmonary hypertension (HCC)   4 Term 01/05/10 39w0d  6 lb 14 oz (3.118 kg) F Vag-Spont EPI N SHENG   3 SAB            2 Term 10/18/07 39w0d  8 lb 7 oz (3.827 kg) F Vag-Spont EPI N SHENG   1 Term  39w1d  9 lb 13.5 oz (4.465 kg) M Vag-Spont EPI  SHENG       No chief complaint on file. Blood pressure (!) 92/54, pulse 79, weight 189 lb (85.7 kg), not currently breastfeeding. The patient was seen and evaluated. There was N/A fetal movements. No contractions or leakage of fluid. Signs and symptoms of  labor as well as labor were reviewed. Dates were reviewed with the patient. The patient will return to the office for her next visit in 1 week. See antepartum flow sheet. Patient Active Problem List    Diagnosis Date Noted    18 weeks gestation of pregnancy 2020    17 weeks gestation of pregnancy 2020    Fatty liver 2018    Cyst of right ovary 2018    History of Thyroid Disorder 2014     No medications: TSH was monitored and found to be normal      History of pulmonary hypertension 2014     History of Pulmonary HTN in prior pregnancy- normal ECHO in  per patient      Pelvic pain  07/15/2014     Musculoskeletal. Pubic Symphysis strain  Flexeril and pregnancy support belt given 07/15/2014        High-risk pregnancy in second trimester 07/15/2014     Rh+/RI/GBS neg  Quad screen: WNL  Male          Diagnosis Orders   1. 18 weeks gestation of pregnancy     2. High-risk pregnancy in second trimester     3.  History of pulmonary hypertension         Patient continues to complain of shortness of breath on exertion. She says she is scheduled with [facility], but she is not scheduled with a pulmonologist.  Fetal heart rate auscultated at 140 bpm and fundal height is 20 cm. today. Patient advised to continue taking prenatal vitamins and to rest as necessary. Patient was advised that she can cancel her appointment here on the week that she sees M. She also states that she has some time off of work and is hoping to get her 3 hour glucose test done. Roxanne Conklin am scribing for, and in the presence of Dr. Bhakti Abreu. Electronically signed by: Lisandra Almanza 12/22/20 4:41 PM   I agree to the above documentation placed by my scribe Lisandra Almanza. I reviewed the scribe's note and agree with the documented findings and plan of care. Any areas of disagreement are noted on the chart. I have personally evaluated this patient. Additional findings are as noted. I agree with the chief complaint, past medical history, past surgical history, allergies, medications, social and family history as documented unless otherwise noted below.      Electronically signed by Bhakti Abreu MD on 12/23/2020 at 3:32 AM

## 2020-12-22 NOTE — PROGRESS NOTES
No problems this visit. Gets a little short of breath on exertion. She is not feeling fetal movement yet.

## 2020-12-30 ENCOUNTER — ROUTINE PRENATAL (OUTPATIENT)
Dept: OBGYN CLINIC | Age: 30
End: 2020-12-30

## 2020-12-30 VITALS
SYSTOLIC BLOOD PRESSURE: 129 MMHG | DIASTOLIC BLOOD PRESSURE: 79 MMHG | WEIGHT: 187 LBS | HEART RATE: 94 BPM | TEMPERATURE: 97.4 F | BODY MASS INDEX: 35.33 KG/M2

## 2020-12-30 PROCEDURE — 0502F SUBSEQUENT PRENATAL CARE: CPT | Performed by: CLINICAL NURSE SPECIALIST

## 2020-12-30 NOTE — PROGRESS NOTES
Vargas Wilks is a 31 y.o. female 19w6d, patient reports episodes of feeling like she is going to faint and she checks her heart rate and it has been in the 150's she reports laying down and then her heart rate drops to 100 then she begins to feel better. [id number]    OB History    Para Term  AB Living   8 5 5   2 5   SAB TAB Ectopic Molar Multiple Live Births   2         5      # Outcome Date GA Lbr Sylvester/2nd Weight Sex Delivery Anes PTL Lv   8 Current            7 2019           6 Term 14 39w0d  9 lb 13 oz (4.451 kg) M Vag-Spont EPI N SHENG   5 Term 13 38w0d  7 lb 6 oz (3.345 kg) F Vag-Spont EPI N SHENG      Complications: Pulmonary hypertension (HCC)   4 Term 01/05/10 39w0d  6 lb 14 oz (3.118 kg) F Vag-Spont EPI N SHENG   3 SAB            2 Term 10/18/07 39w0d  8 lb 7 oz (3.827 kg) F Vag-Spont EPI N SHENG   1 Term  39w1d  9 lb 13.5 oz (4.465 kg) M Vag-Spont EPI  SHENG         Blood pressure 129/79, pulse 94, temperature 97.4 °F (36.3 °C), temperature source Infrared, weight 187 lb (84.8 kg), not currently breastfeeding. The patient was seen and evaluated. There was positive fetal movements. No contractions or leakage of fluid. Signs and symptoms of  labor as well as labor were reviewed. The patients anatomy ultrasound has been completed and reviewed with patient. A 28 week lab panel was ordered. This includes a (CBC, 1 hr GTT). The patient is to complete this in the next two to four weeks. The S/S of Pre-Eclampsia were reviewed with the patient in detail. She is to report any of these if they occur. She currently denies any of these. The patient is RH positive Rhogam Ordered: Not due at this time.     Patient Active Problem List    Diagnosis Date Noted    18 weeks gestation of pregnancy 2020    17 weeks gestation of pregnancy 2020    Fatty liver 2018    Cyst of right ovary 2018    History of Thyroid Disorder 2014     No medications: TSH was monitored and found to be normal      History of pulmonary hypertension 09/03/2014     History of Pulmonary HTN in prior pregnancy- normal ECHO in 2014 per patient      Pelvic pain  07/15/2014     Musculoskeletal. Pubic Symphysis strain  Flexeril and pregnancy support belt given 07/15/2014        High-risk pregnancy in second trimester 07/15/2014     Rh+/RI/GBS neg  Quad screen: WNL  Male          Diagnosis Orders   1. Encounter for supervision of high risk pregnancy in second trimester, antepartum     2. 19 weeks gestation of pregnancy     3. Pulmonary hypertension (HCC)     Continue prenatal vitamins, increase water intake and frequent rest periods as needed. Patient instructed to keep MFM appt. Patient encouraged to go to ER when she has high heart rate and patient verbalized understanding. The patient will return to the office for her next visit in 2 weeks per Dr. Miguel Powell ( patient requested and he approved). See antepartum flow sheet.      Electronically signed by: Tamera Lutz CNP

## 2021-01-04 ENCOUNTER — ROUTINE PRENATAL (OUTPATIENT)
Dept: PERINATAL CARE | Age: 31
End: 2021-01-04
Payer: COMMERCIAL

## 2021-01-04 VITALS
RESPIRATION RATE: 16 BRPM | WEIGHT: 189 LBS | SYSTOLIC BLOOD PRESSURE: 108 MMHG | HEIGHT: 61 IN | BODY MASS INDEX: 35.68 KG/M2 | HEART RATE: 83 BPM | DIASTOLIC BLOOD PRESSURE: 62 MMHG | TEMPERATURE: 98.1 F

## 2021-01-04 DIAGNOSIS — O99.282 HYPERTHYROIDISM AFFECTING PREGNANCY IN SECOND TRIMESTER: ICD-10-CM

## 2021-01-04 DIAGNOSIS — O09.292 HISTORY OF MACROSOMIA IN INFANT IN PRIOR PREGNANCY, CURRENTLY PREGNANT IN SECOND TRIMESTER: ICD-10-CM

## 2021-01-04 DIAGNOSIS — E05.90 HYPERTHYROIDISM AFFECTING PREGNANCY IN SECOND TRIMESTER: ICD-10-CM

## 2021-01-04 DIAGNOSIS — O99.212 OBESITY AFFECTING PREGNANCY IN SECOND TRIMESTER: ICD-10-CM

## 2021-01-04 DIAGNOSIS — Z36.86 SCREENING, ANTENATAL, FOR RISK OF PRE-TERM LABOR: ICD-10-CM

## 2021-01-04 DIAGNOSIS — Z86.79 HISTORY OF PULMONARY HYPERTENSION: Primary | ICD-10-CM

## 2021-01-04 DIAGNOSIS — Z3A.20 20 WEEKS GESTATION OF PREGNANCY: ICD-10-CM

## 2021-01-04 DIAGNOSIS — O99.810 ABNORMAL MATERNAL GLUCOSE TOLERANCE, ANTEPARTUM: ICD-10-CM

## 2021-01-04 DIAGNOSIS — O99.412 MATERNAL CARDIOVASCULAR DISEASE AFFECTING PREGNANCY IN SECOND TRIMESTER: Primary | ICD-10-CM

## 2021-01-04 PROBLEM — F90.9 ADHD: Status: ACTIVE | Noted: 2021-01-04

## 2021-01-04 PROBLEM — Z3A.17 17 WEEKS GESTATION OF PREGNANCY: Status: RESOLVED | Noted: 2020-12-16 | Resolved: 2021-01-04

## 2021-01-04 PROBLEM — Z3A.18 18 WEEKS GESTATION OF PREGNANCY: Status: RESOLVED | Noted: 2020-12-22 | Resolved: 2021-01-04

## 2021-01-04 PROCEDURE — 99243 OFF/OP CNSLTJ NEW/EST LOW 30: CPT | Performed by: OBSTETRICS & GYNECOLOGY

## 2021-01-04 PROCEDURE — 76811 OB US DETAILED SNGL FETUS: CPT | Performed by: OBSTETRICS & GYNECOLOGY

## 2021-01-04 PROCEDURE — 76817 TRANSVAGINAL US OBSTETRIC: CPT | Performed by: OBSTETRICS & GYNECOLOGY

## 2021-01-13 ENCOUNTER — ROUTINE PRENATAL (OUTPATIENT)
Dept: OBGYN CLINIC | Age: 31
End: 2021-01-13

## 2021-01-13 VITALS
HEART RATE: 78 BPM | DIASTOLIC BLOOD PRESSURE: 62 MMHG | BODY MASS INDEX: 37.03 KG/M2 | SYSTOLIC BLOOD PRESSURE: 104 MMHG | WEIGHT: 196 LBS

## 2021-01-13 DIAGNOSIS — O09.92 HIGH-RISK PREGNANCY IN SECOND TRIMESTER: ICD-10-CM

## 2021-01-13 DIAGNOSIS — Z86.79 HISTORY OF PULMONARY HYPERTENSION: ICD-10-CM

## 2021-01-13 DIAGNOSIS — Z3A.21 21 WEEKS GESTATION OF PREGNANCY: Primary | ICD-10-CM

## 2021-01-13 DIAGNOSIS — K76.0 FATTY LIVER: ICD-10-CM

## 2021-01-13 PROCEDURE — 0502F SUBSEQUENT PRENATAL CARE: CPT | Performed by: SPECIALIST

## 2021-01-13 ASSESSMENT — PATIENT HEALTH QUESTIONNAIRE - PHQ9
1. LITTLE INTEREST OR PLEASURE IN DOING THINGS: 0
SUM OF ALL RESPONSES TO PHQ QUESTIONS 1-9: 0

## 2021-01-13 NOTE — PROGRESS NOTES
55%, RV pressure 32 mmg indicated mild pulm HTN     History of Pulmonary HTN in prior pregnancy- normal ECHO in 2014 per patient      Pelvic pain  07/15/2014     Musculoskeletal. Pubic Symphysis strain  Flexeril and pregnancy support belt given 07/15/2014        High-risk pregnancy in second trimester 07/15/2014     Rh+/RI/GBS neg  Quad screen: WNL  Male      Hx of postpartum depression, currently pregnant 07/15/2014        Diagnosis Orders   1. 21 weeks gestation of pregnancy     2. High-risk pregnancy in second trimester     3. History of pulmonary hypertension (G3)     4. Fatty liver         Patient is scheduled to see a cardiologist for evaluation of pulmonary hypertension later this month. Fetal heart rate auscultated at 143 bpm and fundal height is 21 cm. today. Patient advised to continue taking prenatal vitamins and to rest as necessary. The patient will return to the office for her next visit in 1 week. See antepartum flow sheet. Dieog Urban am scribing for, and in the presence of Dr. Leelee Sevilla. Electronically signed by: Shabnam Tafoya 1/13/21 4:36 PM     I agree to the above documentation placed by my scribe Shabnam Tafoya. I reviewed the scribe's note and agree with the documented findings and plan of care. Any areas of disagreement are noted on the chart. I have personally evaluated this patient. Additional findings are as noted. I agree with the chief complaint, past medical history, past surgical history, allergies, medications, social and family history as documented unless otherwise noted below.      Electronically signed by Leelee Sevilla MD on 1/14/2021 at 4:55 AM

## 2021-01-16 ENCOUNTER — HOSPITAL ENCOUNTER (EMERGENCY)
Age: 31
Discharge: HOME | End: 2021-01-16
Attending: EMERGENCY MEDICINE
Payer: COMMERCIAL

## 2021-01-16 ENCOUNTER — APPOINTMENT (OUTPATIENT)
Dept: GENERAL RADIOLOGY | Age: 31
End: 2021-01-16
Payer: COMMERCIAL

## 2021-01-16 VITALS
DIASTOLIC BLOOD PRESSURE: 66 MMHG | RESPIRATION RATE: 16 BRPM | OXYGEN SATURATION: 100 % | TEMPERATURE: 97.7 F | BODY MASS INDEX: 35.9 KG/M2 | SYSTOLIC BLOOD PRESSURE: 125 MMHG | WEIGHT: 190 LBS | HEART RATE: 85 BPM

## 2021-01-16 DIAGNOSIS — R00.2 PALPITATIONS: Primary | ICD-10-CM

## 2021-01-16 PROBLEM — Z3A.21 21 WEEKS GESTATION OF PREGNANCY: Status: RESOLVED | Noted: 2021-01-13 | Resolved: 2021-01-16

## 2021-01-16 LAB
ABSOLUTE EOS #: 0.21 K/UL (ref 0–0.44)
ABSOLUTE IMMATURE GRANULOCYTE: 0.15 K/UL (ref 0–0.3)
ABSOLUTE LYMPH #: 1.75 K/UL (ref 1.1–3.7)
ABSOLUTE MONO #: 1.14 K/UL (ref 0.1–1.2)
ALBUMIN SERPL-MCNC: 3 G/DL (ref 3.5–5.2)
ALBUMIN/GLOBULIN RATIO: 1.1 (ref 1–2.5)
ALP BLD-CCNC: 62 U/L (ref 35–104)
ALT SERPL-CCNC: 6 U/L (ref 5–33)
ANION GAP SERPL CALCULATED.3IONS-SCNC: 7 MMOL/L (ref 9–17)
AST SERPL-CCNC: 11 U/L
BASOPHILS # BLD: 0 % (ref 0–2)
BASOPHILS ABSOLUTE: 0.04 K/UL (ref 0–0.2)
BILIRUB SERPL-MCNC: <0.1 MG/DL (ref 0.3–1.2)
BUN BLDV-MCNC: 6 MG/DL (ref 6–20)
BUN/CREAT BLD: ABNORMAL (ref 9–20)
CALCIUM SERPL-MCNC: 8.8 MG/DL (ref 8.6–10.4)
CHLORIDE BLD-SCNC: 107 MMOL/L (ref 98–107)
CO2: 20 MMOL/L (ref 20–31)
CREAT SERPL-MCNC: 0.52 MG/DL (ref 0.5–0.9)
DIFFERENTIAL TYPE: ABNORMAL
EOSINOPHILS RELATIVE PERCENT: 2 % (ref 1–4)
GFR AFRICAN AMERICAN: >60 ML/MIN
GFR NON-AFRICAN AMERICAN: >60 ML/MIN
GFR SERPL CREATININE-BSD FRML MDRD: ABNORMAL ML/MIN/{1.73_M2}
GFR SERPL CREATININE-BSD FRML MDRD: ABNORMAL ML/MIN/{1.73_M2}
GLUCOSE BLD-MCNC: 99 MG/DL (ref 70–99)
HCT VFR BLD CALC: 31.8 % (ref 36.3–47.1)
HEMOGLOBIN: 10.6 G/DL (ref 11.9–15.1)
IMMATURE GRANULOCYTES: 1 %
LYMPHOCYTES # BLD: 14 % (ref 24–43)
MAGNESIUM: 1.9 MG/DL (ref 1.6–2.6)
MCH RBC QN AUTO: 31.5 PG (ref 25.2–33.5)
MCHC RBC AUTO-ENTMCNC: 33.3 G/DL (ref 28.4–34.8)
MCV RBC AUTO: 94.4 FL (ref 82.6–102.9)
MONOCYTES # BLD: 9 % (ref 3–12)
NRBC AUTOMATED: 0 PER 100 WBC
PDW BLD-RTO: 13 % (ref 11.8–14.4)
PLATELET # BLD: 282 K/UL (ref 138–453)
PLATELET ESTIMATE: ABNORMAL
PMV BLD AUTO: 11.4 FL (ref 8.1–13.5)
POTASSIUM SERPL-SCNC: 3.4 MMOL/L (ref 3.7–5.3)
RBC # BLD: 3.37 M/UL (ref 3.95–5.11)
RBC # BLD: ABNORMAL 10*6/UL
SEG NEUTROPHILS: 74 % (ref 36–65)
SEGMENTED NEUTROPHILS ABSOLUTE COUNT: 9.09 K/UL (ref 1.5–8.1)
SODIUM BLD-SCNC: 134 MMOL/L (ref 135–144)
TOTAL PROTEIN: 5.8 G/DL (ref 6.4–8.3)
TROPONIN INTERP: NORMAL
TROPONIN T: NORMAL NG/ML
TROPONIN, HIGH SENSITIVITY: <6 NG/L (ref 0–14)
WBC # BLD: 12.4 K/UL (ref 3.5–11.3)
WBC # BLD: ABNORMAL 10*3/UL

## 2021-01-16 PROCEDURE — 83735 ASSAY OF MAGNESIUM: CPT

## 2021-01-16 PROCEDURE — 93005 ELECTROCARDIOGRAM TRACING: CPT | Performed by: STUDENT IN AN ORGANIZED HEALTH CARE EDUCATION/TRAINING PROGRAM

## 2021-01-16 PROCEDURE — 99284 EMERGENCY DEPT VISIT MOD MDM: CPT

## 2021-01-16 PROCEDURE — 84484 ASSAY OF TROPONIN QUANT: CPT

## 2021-01-16 PROCEDURE — 85025 COMPLETE CBC W/AUTO DIFF WBC: CPT

## 2021-01-16 PROCEDURE — 71045 X-RAY EXAM CHEST 1 VIEW: CPT

## 2021-01-16 PROCEDURE — 80053 COMPREHEN METABOLIC PANEL: CPT

## 2021-01-16 ASSESSMENT — ENCOUNTER SYMPTOMS
ABDOMINAL PAIN: 0
VOMITING: 0
BACK PAIN: 0
SHORTNESS OF BREATH: 0
NAUSEA: 0

## 2021-01-16 NOTE — ED NOTES
Pt to ED cc tachycardia at home around 1300  Pt states she was in the shower and became dizzy and her HR was 140  Pt denies any CP/SOB  Pt states she is about 22 weeks preg but has no cramping or vaginal complaints   Pt placed on continuous pulse ox, and BP. Pt alert and oriented x4, talking in complete sentences, respirations even and unlabored. Pt acting age appropriate.  White board updated,       Willi Davison RN  01/16/21 0104

## 2021-01-16 NOTE — ED PROVIDER NOTES
[facility] ED  Emergency Department Encounter  Emergency Medicine Resident     Pt Name: Cleta Riedel  MRN: [id number]  [place] 1990  Date of evaluation: 1/16/21  PCP:  KADEEM Weiss CNP    CHIEF COMPLAINT       Chief Complaint   Patient presents with    Tachycardia     heart rate at home earlier wa 140       HISTORY OFPRESENT ILLNESS  (Location/Symptom, Timing/Onset, Context/Setting, Quality, Duration, Modifying Factors,Severity.)      Cleta Riedel is a 31 y. o.yo female who presents with tachy. Patient is 21 weeks pregnant was seen by OB a day ago and was told to follow-up with the emergency department if she continues to have palpitations, states she has been having heart racing/palpitations/tachycardia on and off during pregnancy does have history of pulmonary hypertension, plan to follow-up with Dr. Arnaldo Carvajal cardiology in 2 weeks. States that she was told to come the emergency department and get evaluated and get an EKG. Denies any chest pain, abdominal pain, vaginal complaints. Denies any vaginal bleeding, traumatic injuries to the belly, headache or vision changes. Denies focal neuro deficits. PAST MEDICAL / SURGICAL / SOCIAL / FAMILY HISTORY      has a past medical history of ADHD, Anemia, Complication of anesthesia, Fatty liver, History of Thyroid Disorder, Ovarian cyst, and Postpartum depression. has a past surgical history that includes Cholecystectomy (2011).      Social History     Socioeconomic History    Marital status:      Spouse name: Not on file    Number of children: Not on file    Years of education: Not on file    Highest education level: Not on file   Occupational History    Not on file   Social Needs    Financial resource strain: Not on file    Food insecurity     Worry: Not on file     Inability: Not on file    Transportation needs     Medical: Not on file     Non-medical: Not on file   Tobacco Use    Smoking status: Former Smoker     Quit date: 10/18/2020     Years since quittin.2    Smokeless tobacco: Never Used   Substance and Sexual Activity    Alcohol use: Not Currently     Alcohol/week: 0.0 standard drinks     Comment: occasional    Drug use: No    Sexual activity: Yes     Partners: Male   Lifestyle    Physical activity     Days per week: Not on file     Minutes per session: Not on file    Stress: Not on file   Relationships    Social connections     Talks on phone: Not on file     Gets together: Not on file     Attends [employer] service: Not on file     Active member of club or organization: Not on file     Attends meetings of clubs or organizations: Not on file     Relationship status: Not on file    Intimate partner violence     Fear of current or ex partner: Not on file     Emotionally abused: Not on file     Physically abused: Not on file     Forced sexual activity: Not on file   Other Topics Concern    Not on file   Social History Narrative    Not on file       History reviewed. No pertinent family history. Allergies:  Topamax [topiramate] and Zoloft [sertraline hcl]    Home Medications:  Prior to Admission medications    Medication Sig Start Date End Date Taking? Authorizing Provider   butalbital-acetaminophen-caffeine (FIORICET, ESGIC) -95 MG per tablet Take 1 tablet by mouth every 4 hours as needed for Headaches 20   Floridalma Healy MD   Prenatal Vit-Fe Fumarate-FA (PNV PRENATAL PLUS MULTIVITAMIN) 27-1 MG TABS Take 1 tablet by mouth daily 10/21/20 10/16/21  Janell Rondon APRN - CNP       REVIEW OFSYSTEMS    (2-9 systems for level 4, 10 or more for level 5)      Review of Systems   Constitutional: Negative for diaphoresis and fever. HENT: Negative for congestion. Eyes: Negative for visual disturbance. Respiratory: Negative for shortness of breath. Cardiovascular: Positive for palpitations. Negative for chest pain.    Gastrointestinal: Negative for abdominal pain, nausea and vomiting. Endocrine: Negative for polyuria. Genitourinary: Negative for dysuria. Musculoskeletal: Negative for back pain. Skin: Negative for wound. Neurological: Negative for headaches. Psychiatric/Behavioral: Negative for confusion. PHYSICAL EXAM   (up to 7 for level 4, 8 or more forlevel 5)      ED TRIAGE VITALS BP: 125/66, Temp: 97.7 °F (36.5 °C), Pulse: 85, Resp: 16, SpO2: 100 %    Vitals:    01/16/21 1846 01/16/21 1850   BP:  125/66   Pulse:  85   Resp:  16   Temp: 97.7 °F (36.5 °C)    TempSrc: Oral    SpO2:  100%   Weight:  190 lb (86.2 kg)       Physical Exam  Constitutional:       General: She is not in acute distress. Appearance: She is well-developed. HENT:      Head: Normocephalic and atraumatic. Nose: Nose normal.   Eyes:      Pupils: Pupils are equal, round, and reactive to light. Neck:      Musculoskeletal: Normal range of motion and neck supple. Cardiovascular:      Rate and Rhythm: Normal rate and regular rhythm. Heart sounds: No murmur. Pulmonary:      Effort: Pulmonary effort is normal. No respiratory distress. Breath sounds: No stridor. No wheezing. Abdominal:      General: There is no distension. Palpations: Abdomen is soft. Tenderness: There is no abdominal tenderness. Musculoskeletal: Normal range of motion. General: No tenderness. Skin:     General: Skin is warm and dry. Capillary Refill: Capillary refill takes less than 2 seconds. Findings: No erythema or rash. Neurological:      Mental Status: She is alert and oriented to person, place, and time. Sensory: No sensory deficit.       Deep Tendon Reflexes: Reflexes normal.   Psychiatric:         Behavior: Behavior normal.         DIFFERENTIAL  DIAGNOSIS     PLAN (LABS / IMAGING / EKG):  Orders Placed This Encounter   Procedures    Culture, Urine    XR CHEST PORTABLE    CBC Auto Differential    Comprehensive Metabolic Panel w/ Reflex to MG    Troponin    could be chances of a blood clot in her lung at this time does not want a CT scan. With joint decision-making we came to the conclusion that PE was less likely and at this time holding off on scan.    [PS]      ED Course User Index  [PS] Inna Nelson MD          PROCEDURES:  None    CONSULTS:  IP CONSULT TO OB GYN    CRITICAL CARE:  Please see attending note    FINAL IMPRESSION      1.  Palpitations          DISPOSITION / PLAN     DISPOSITION Discharge - Pending Orders Complete 01/16/2021 09:05:50 PM   discharge    PATIENT REFERRED TO:  KADEEM Hare - CNP  [address] [facility]  [address]  [address] [address]  [phone]    In 3 days      [facility] ED  [address]  [phone]  Schedule an appointment as soon as possible for a visit       Shelly Canales, [address] [address]  [phone]      Make an appointment soon as possible      DISCHARGE MEDICATIONS:  New Prescriptions    No medications on file       Inna Nelson MD  Emergency Medicine Resident    (Please note that portions of this note were completed with a voice recognition program.Efforts were made to edit the dictations but occasionally words are mis-transcribed.)     Inna Nelson MD  Resident  01/16/21 5478

## 2021-01-17 NOTE — ED PROVIDER NOTES
[facility]     Emergency Department     Faculty Attestation    I performed a history and physical examination of the patient and discussed management with the resident. I reviewed the residents note and agree with the documented findings and plan of care. Any areas of disagreement are noted on the chart. I was personally present for the key portions of any procedures. I have documented in the chart those procedures where I was not present during the key portions. I have reviewed the emergency nurses triage note. I agree with the chief complaint, past medical history, past surgical history, allergies, medications, social and family history as documented unless otherwise noted below. For Physician Assistant/ Nurse Practitioner cases/documentation I have personally evaluated this patient and have completed at least one if not all key elements of the E/M (history, physical exam, and MDM). Additional findings are as noted. I have personally seen and evaluated the patient. I find the patient's history and physical exam are consistent with the NP/PA documentation. I agree with the care provided, treatment rendered, disposition and follow-up plan. 26-year-old female, 22 weeks pregnant presenting with palpitations. Palpitations have been intermittently present for approximately half of her pregnancy. She talked to her OB about them and was told to come into the ER. Mild shortness of breath with exertion, no shortness of breath at rest.  No unilateral leg swelling. Mother has had blood clots in the past, but patient states that there was no clotting factor abnormalities, and that they occurred after surgery. No chest pain. Echocardiogram done in December for history of pulmonary hypertension, RVSP 32 mmHg (mild pulmonary hypertension per echocardiogram read).        Exam:  General: Laying on the bed, awake, alert and in no acute distress  CV: normal rate and regular rhythm  Lungs: Breathing comfortably on room air with no tachypnea, hypoxia, or increased work of breathing  Abdomen: soft, non-tender, non-distended    Plan:  Fetal heart rate of 146 by bedside ultrasound done by the resident  Cardiac work-up including hemoglobin, troponin, and electrolytes with no acute findings. Chest x-ray negative. I did discuss with the patient the possibility of PE, lower likelihood given length of symptoms, absent tachycardia and hypoxemia on exam here. She does not want CT scan at this time, and understands that this may mean missing a blood clot, even though we believe that she is low risk for this. Case discussed with OB resident by ER resident. Patient has cardiology follow-up in 8 days, instructed to return to the ER with any chest pain or new symptoms.   Patient discharged home        Dilcia Ryder MD   Attending Emergency  Physician    (Please note that portions of this note were completed with a voice recognition program. Efforts were made to edit the dictations but occasionally words are mis-transcribed.)              Dilcia Ryder MD  01/17/21 1007

## 2021-01-17 NOTE — CONSULTS
Ob/Gyn was consulted as a drive by just to be aware that patient is here and begin evaluated in the ED. She has a known history of pulmonary hypertension and will be following up with cardiology in two weeks. She has no obstetrical complaints at this time and BSUS performed by the ED resident got heart tones in the 140's. Patient is stable for discharge from an OB/GYN standpoint once cleared by the ED. Will follow up outpatient. Attending updated and in agreement with plan.     Enzo Beck, DO, PGY-1  Ob/Gyn Resident  [address]  01/16/21

## 2021-01-17 NOTE — ED NOTES
Pt has no further complaints at this time, states she understands everything and has no current questions.      Belia Garcia RN  01/16/21 5334

## 2021-01-18 LAB
EKG ATRIAL RATE: 79 BPM
EKG P AXIS: -2 DEGREES
EKG P-R INTERVAL: 140 MS
EKG Q-T INTERVAL: 382 MS
EKG QRS DURATION: 76 MS
EKG QTC CALCULATION (BAZETT): 438 MS
EKG R AXIS: 15 DEGREES
EKG T AXIS: 39 DEGREES
EKG VENTRICULAR RATE: 79 BPM

## 2021-01-18 PROCEDURE — 93010 ELECTROCARDIOGRAM REPORT: CPT | Performed by: INTERNAL MEDICINE

## 2021-01-20 ENCOUNTER — ROUTINE PRENATAL (OUTPATIENT)
Dept: OBGYN CLINIC | Age: 31
End: 2021-01-20

## 2021-01-20 VITALS
HEART RATE: 72 BPM | BODY MASS INDEX: 37.03 KG/M2 | DIASTOLIC BLOOD PRESSURE: 68 MMHG | SYSTOLIC BLOOD PRESSURE: 100 MMHG | WEIGHT: 196 LBS

## 2021-01-20 DIAGNOSIS — E07.9 THYROID DISORDER: ICD-10-CM

## 2021-01-20 DIAGNOSIS — I27.20 PULMONARY HYPERTENSION (HCC): ICD-10-CM

## 2021-01-20 DIAGNOSIS — Z3A.22 22 WEEKS GESTATION OF PREGNANCY: ICD-10-CM

## 2021-01-20 DIAGNOSIS — O09.92 HIGH-RISK PREGNANCY IN SECOND TRIMESTER: Primary | ICD-10-CM

## 2021-01-20 PROCEDURE — 0502F SUBSEQUENT PRENATAL CARE: CPT | Performed by: CLINICAL NURSE SPECIALIST

## 2021-01-20 NOTE — PROGRESS NOTES
Magda Clifford is a 31 y.o. female 22w6d, patient reports that she did call on call physician this week for increased heart rate and was sent to the ER for evaluation. HR has returned to normal.     [id number]    OB History    Para Term  AB Living   7 4 4   2 4   SAB TAB Ectopic Molar Multiple Live Births   2         4      # Outcome Date GA Lbr Sylvester/2nd Weight Sex Delivery Anes PTL Lv   7 Current            6 2019           5 Term 14 39w0d  9 lb 13 oz (4.451 kg) M Vag-Spont EPI N SHENG   4 Term 13 38w0d  7 lb 6 oz (3.345 kg) F Vag-Spont EPI N SHENG      Complications: Pulmonary hypertension (HCC)   3 Term 01/05/10 39w0d  6 lb 14 oz (3.118 kg) F Vag-Spont EPI N SHENG   2 2009           1 Term 10/18/07 39w0d  8 lb 7 oz (3.827 kg) F Vag-Spont EPI N SHENG         Blood pressure 100/68, pulse 72, weight 196 lb (88.9 kg), not currently breastfeeding. The patient was seen and evaluated. There was positive fetal movements. No contractions or leakage of fluid. Signs and symptoms of  labor as well as labor were reviewed. The patients anatomy ultrasound has been completed and reviewed with patient. A 28 week lab panel was ordered. This includes a (CBC, 1 hr GTT). The patient is to complete this in the next two to four weeks. The S/S of Pre-Eclampsia were reviewed with the patient in detail. She is to report any of these if they occur. She currently denies any of these. The patient is RH positive Rhogam Ordered: Not due at this time. Patient Active Problem List    Diagnosis Date Noted    ADHD 2021     The patient was on Concerta      Fatty liver 2018     She had elevated LFTs that have normalized. She followed with GI. Work up for Intrinsic hepatic disease was negative.      Severe hepatic steatosis seen on Ct on 2018      Cyst of right ovary 2018    History of Thyroid Disorder (G3) 2014     Per patient she was diagnosed with hyperthyroidism in third pregnancy and was on medications. No issues outside of pregnancy. Not currently on meds.  History of pulmonary hypertension (G3) 09/03/2014     Echo 12/7/20: EF 55%, RV pressure 32 mmg indicated mild pulm HTN     History of Pulmonary HTN in prior pregnancy- normal ECHO in 2014 per patient      Pelvic pain  07/15/2014     Musculoskeletal. Pubic Symphysis strain  Flexeril and pregnancy support belt given 07/15/2014        High-risk pregnancy in second trimester 07/15/2014     Rh+/RI/GBS neg  Quad screen: WNL  Male      Hx of postpartum depression, currently pregnant 07/15/2014        Diagnosis Orders   1. High-risk pregnancy in second trimester     2. Pulmonary hypertension ([address]     3. History of Thyroid Disorder (G3)     4. 22 weeks gestation of pregnancy     Continue prenatal vitamins, increase water intake and frequent rest periods as needed. The patient will return to the office for her next visit in 1 weeks. See antepartum flow sheet.      Electronically signed by: Bereket Fuentes CNP

## 2021-01-26 ENCOUNTER — ROUTINE PRENATAL (OUTPATIENT)
Dept: OBGYN CLINIC | Age: 31
End: 2021-01-26

## 2021-01-26 VITALS
BODY MASS INDEX: 36.66 KG/M2 | DIASTOLIC BLOOD PRESSURE: 78 MMHG | SYSTOLIC BLOOD PRESSURE: 118 MMHG | HEART RATE: 76 BPM | WEIGHT: 194 LBS

## 2021-01-26 DIAGNOSIS — Z86.79 HISTORY OF PULMONARY HYPERTENSION: ICD-10-CM

## 2021-01-26 DIAGNOSIS — Z3A.23 23 WEEKS GESTATION OF PREGNANCY: Primary | ICD-10-CM

## 2021-01-26 DIAGNOSIS — O09.92 HIGH-RISK PREGNANCY IN SECOND TRIMESTER: ICD-10-CM

## 2021-01-26 PROCEDURE — 0502F SUBSEQUENT PRENATAL CARE: CPT | Performed by: SPECIALIST

## 2021-01-26 NOTE — PROGRESS NOTES
Leo Torres is a 31 y.o. female 23w5d    [id number]    OB History    Para Term  AB Living   7 4 4   2 4   SAB TAB Ectopic Molar Multiple Live Births   2         4      # Outcome Date GA Lbr Sylvester/2nd Weight Sex Delivery Anes PTL Lv   7 Current            6 SAB            5 Term 14 39w0d  9 lb 13 oz (4.451 kg) M Vag-Spont EPI N SHENG   4 Term 13 38w0d  7 lb 6 oz (3.345 kg) F Vag-Spont EPI N SHENG      Complications: Pulmonary hypertension (HCC)   3 Term 01/05/10 39w0d  6 lb 14 oz (3.118 kg) F Vag-Spont EPI N SHENG   2 SAB            1 Term 10/18/07 39w0d  8 lb 7 oz (3.827 kg) F Vag-Spont EPI N SHENG       Chief Complaint   Patient presents with    High Risk Pregnancy        Blood pressure 118/78, pulse 76, weight 194 lb (88 kg), not currently breastfeeding. The patient was seen and evaluated. There was positive fetal movements. No contractions or leakage of fluid. Signs and symptoms of  labor as well as labor were reviewed. The patient's anatomy ultrasound has been completed and reviewed with patient. The S/S of Pre-Eclampsia were reviewed with the patient in detail. She is to report any of these if they occur. She currently denies any of these. The patient is RH positive Rhogam Ordered: Not indicated. Patient Active Problem List    Diagnosis Date Noted    23 weeks gestation of pregnancy 2021    ADHD 2021     The patient was on Concerta      Fatty liver 2018     She had elevated LFTs that have normalized. She followed with GI. Work up for Intrinsic hepatic disease was negative. Severe hepatic steatosis seen on Ct on 2018      Cyst of right ovary 2018    History of Thyroid Disorder (G3) 2014     Per patient she was diagnosed with hyperthyroidism in third pregnancy and was on medications. No issues outside of pregnancy. Not currently on meds.          History of pulmonary hypertension (G3) 2014     Echo 20: EF 55%, RV pressure 32 mmg indicated mild pulm HTN     History of Pulmonary HTN in prior pregnancy- normal ECHO in 2014 per patient      Pelvic pain  07/15/2014     Musculoskeletal. Pubic Symphysis strain  Flexeril and pregnancy support belt given 07/15/2014        High-risk pregnancy in second trimester 07/15/2014     Rh+/RI/GBS neg  Quad screen: WNL  Male      Hx of postpartum depression, currently pregnant 07/15/2014        Diagnosis Orders   1. 23 weeks gestation of pregnancy     2. History of pulmonary hypertension (G3)     3. High-risk pregnancy in second trimester         Patient complains of shortness of breath and feeling like she is going to pass out. She is seeing cardiologist tomorrow for evaluation of pulmonary hypertension. Fetal heart rate auscultated at 136 bpm and fundal height is 24 cm. today. Patient advised to continue taking prenatal vitamins and to rest as necessary. The patient will return to the office for her next visit in 1 week. See antepartum flow sheet. Lalo Koenig am scribing for, and in the presence of Dr. Farnaz Benítez. Electronically signed by: Gigi Viramontes 1/26/21 4:41 PM   I agree to the above documentation placed by my scribe Gigi Viramontes. I reviewed the scribe's note and agree with the documented findings and plan of care. Any areas of disagreement are noted on the chart. I have personally evaluated this patient. Additional findings are as noted. I agree with the chief complaint, past medical history, past surgical history, allergies, medications, social and family history as documented unless otherwise noted below.      Electronically signed by Farnaz Benítez MD on 1/27/2021 at 2:34 AM

## 2021-02-03 ENCOUNTER — ROUTINE PRENATAL (OUTPATIENT)
Dept: OBGYN CLINIC | Age: 31
End: 2021-02-03

## 2021-02-03 VITALS
HEART RATE: 79 BPM | BODY MASS INDEX: 37.08 KG/M2 | SYSTOLIC BLOOD PRESSURE: 97 MMHG | DIASTOLIC BLOOD PRESSURE: 60 MMHG | HEIGHT: 61 IN | WEIGHT: 196.4 LBS

## 2021-02-03 DIAGNOSIS — Z3A.24 24 WEEKS GESTATION OF PREGNANCY: ICD-10-CM

## 2021-02-03 DIAGNOSIS — O09.92 HIGH-RISK PREGNANCY IN SECOND TRIMESTER: Primary | ICD-10-CM

## 2021-02-03 DIAGNOSIS — Z86.79 HISTORY OF PULMONARY HYPERTENSION: ICD-10-CM

## 2021-02-03 DIAGNOSIS — E07.9 THYROID DISORDER: ICD-10-CM

## 2021-02-03 PROBLEM — Z3A.23 23 WEEKS GESTATION OF PREGNANCY: Status: RESOLVED | Noted: 2021-01-26 | Resolved: 2021-02-03

## 2021-02-03 PROCEDURE — 0502F SUBSEQUENT PRENATAL CARE: CPT | Performed by: CLINICAL NURSE SPECIALIST

## 2021-02-03 NOTE — PROGRESS NOTES
Dago Woodruff is a 31 y.o. female 18w6d, patient reports that she seen cardiology last week and they have ordered a halter monitor but she has not received call to do it yet. [id number]    OB History    Para Term  AB Living   7 4 4   2 4   SAB TAB Ectopic Molar Multiple Live Births   2         4      # Outcome Date GA Lbr Sylvester/2nd Weight Sex Delivery Anes PTL Lv   7 Current            6 2019           5 Term 14 39w0d  9 lb 13 oz (4.451 kg) M Vag-Spont EPI N SHENG   4 Term 13 38w0d  7 lb 6 oz (3.345 kg) F Vag-Spont EPI N SHENG      Complications: Pulmonary hypertension (HCC)   3 Term 01/05/10 39w0d  6 lb 14 oz (3.118 kg) F Vag-Spont EPI N SHENG   2 2009           1 Term 10/18/07 39w0d  8 lb 7 oz (3.827 kg) F Vag-Spont EPI N SHENG         Blood pressure 97/60, pulse 79, height 5' 1\" (1.549 m), weight 196 lb 6.4 oz (89.1 kg), not currently breastfeeding. The patient was seen and evaluated. There was positive fetal movements. No contractions or leakage of fluid. Signs and symptoms of  labor as well as labor were reviewed. The patients anatomy ultrasound has been completed and reviewed with patient. A 28 week lab panel was ordered. This includes a (CBC, 1 hr GTT). The patient is to complete this in the next two to four weeks. The S/S of Pre-Eclampsia were reviewed with the patient in detail. She is to report any of these if they occur. She currently denies any of these. The patient is RH positive Rhogam Ordered: Not due at this time. Patient Active Problem List    Diagnosis Date Noted    ADHD 2021     The patient was on Concerta      Fatty liver 2018     She had elevated LFTs that have normalized. She followed with GI. Work up for Intrinsic hepatic disease was negative.      Severe hepatic steatosis seen on Ct on 2018      Cyst of right ovary 2018    History of Thyroid Disorder (G3) 2014     Per patient she was diagnosed with hyperthyroidism in third pregnancy and was on medications. No issues outside of pregnancy. Not currently on meds.  History of pulmonary hypertension (G3) 09/03/2014     Echo 12/7/20: EF 55%, RV pressure 32 mmg indicated mild pulm HTN     History of Pulmonary HTN in prior pregnancy- normal ECHO in 2014 per patient      Pelvic pain  07/15/2014     Musculoskeletal. Pubic Symphysis strain  Flexeril and pregnancy support belt given 07/15/2014        High-risk pregnancy in second trimester 07/15/2014     Rh+/RI/GBS neg  Quad screen: WNL  Male      Hx of postpartum depression, currently pregnant 07/15/2014        Diagnosis Orders   1. High-risk pregnancy in second trimester     2. History of pulmonary hypertension (G3)     3. 24 weeks gestation of pregnancy     4. History of Thyroid Disorder (G3)     Continue prenatal vitamins, increase water intake and frequent rest periods as needed. The patient will return to the office for her next visit in 1 weeks. See antepartum flow sheet.      Electronically signed by: Janell Rondon CNP

## 2021-02-10 ENCOUNTER — ROUTINE PRENATAL (OUTPATIENT)
Dept: OBGYN CLINIC | Age: 31
End: 2021-02-10

## 2021-02-10 VITALS
HEART RATE: 81 BPM | SYSTOLIC BLOOD PRESSURE: 98 MMHG | DIASTOLIC BLOOD PRESSURE: 65 MMHG | WEIGHT: 200.4 LBS | BODY MASS INDEX: 37.87 KG/M2

## 2021-02-10 DIAGNOSIS — O09.92 HIGH-RISK PREGNANCY IN SECOND TRIMESTER: ICD-10-CM

## 2021-02-10 DIAGNOSIS — Z3A.25 25 WEEKS GESTATION OF PREGNANCY: Primary | ICD-10-CM

## 2021-02-10 DIAGNOSIS — Z86.79 HISTORY OF PULMONARY HYPERTENSION: ICD-10-CM

## 2021-02-10 PROCEDURE — 0502F SUBSEQUENT PRENATAL CARE: CPT | Performed by: SPECIALIST

## 2021-02-10 NOTE — PROGRESS NOTES
Kirit Win is a 31 y.o. female 25w6d    [id number]    OB History    Para Term  AB Living   7 4 4   2 4   SAB TAB Ectopic Molar Multiple Live Births   2         4      # Outcome Date GA Lbr Sylvester/2nd Weight Sex Delivery Anes PTL Lv   7 Current            6 2019           5 Term 14 39w0d  9 lb 13 oz (4.451 kg) M Vag-Spont EPI N SHENG   4 Term 13 38w0d  7 lb 6 oz (3.345 kg) F Vag-Spont EPI N SHENG      Complications: Pulmonary hypertension (HCC)   3 Term 01/05/10 39w0d  6 lb 14 oz (3.118 kg) F Vag-Spont EPI N SHENG   2 2009           1 Term 10/18/07 39w0d  8 lb 7 oz (3.827 kg) F Vag-Spont EPI N SHENG       Chief Complaint   Patient presents with    Routine Prenatal Visit     Patient is 25 weeks and 6 days gestation and is here for supervision of high risk pregnancy.  High Risk Pregnancy     Pulmonary hypertension        Blood pressure 98/65, pulse 81, weight 200 lb 6.4 oz (90.9 kg), not currently breastfeeding. The patient was seen and evaluated. There was positive fetal movements. No contractions or leakage of fluid. Signs and symptoms of  labor as well as labor were reviewed. The patient's anatomy ultrasound has been completed and reviewed with patient. The S/S of Pre-Eclampsia were reviewed with the patient in detail. She is to report any of these if they occur. She currently denies any of these. The patient is RH positive Rhogam Ordered: Not indicated. Patient Active Problem List    Diagnosis Date Noted    25 weeks gestation of pregnancy 02/10/2021    ADHD 2021     The patient was on Concerta      Fatty liver 2018     She had elevated LFTs that have normalized. She followed with GI. Work up for Intrinsic hepatic disease was negative.      Severe hepatic steatosis seen on Ct on 2018      Cyst of right ovary 2018    History of Thyroid Disorder (G3) 2014     Per patient she was diagnosed with hyperthyroidism in third pregnancy and was on medications. No issues outside of pregnancy. Not currently on meds.  History of pulmonary hypertension (G3) 09/03/2014     Echo 12/7/20: EF 55%, RV pressure 32 mmg indicated mild pulm HTN     History of Pulmonary HTN in prior pregnancy- normal ECHO in 2014 per patient      Pelvic pain  07/15/2014     Musculoskeletal. Pubic Symphysis strain  Flexeril and pregnancy support belt given 07/15/2014        High-risk pregnancy in second trimester 07/15/2014     Rh+/RI/GBS neg  Quad screen: WNL  Male      Hx of postpartum depression, currently pregnant 07/15/2014        Diagnosis Orders   1. 25 weeks gestation of pregnancy     2. High-risk pregnancy in second trimester     3. History of pulmonary hypertension (G3)         Patient doing well. Patient states that her cardiologist is not concerned with her pulmonary hypertension but wants her to wear a monitor for 48 hours. They are going to call her when a monitor is available. Fetal heart rate auscultated at 147 bpm and fundal height is 26 cm. today. Patient advised to continue taking prenatal vitamins and to rest as necessary. Patient states that she saw a physician at the hospital with her previous pregnancy and had her pelvis adjusted and she would like that again. Patient was told to find a DO provider for further information. The patient will return to the office for her next visit in 1 week. See antepartum flow sheet. Artis Cadena am scribing for, and in the presence of Dr. Deb Wilson. Electronically signed by: Yvonne Rivera 2/10/21 4:33 PM   I agree to the above documentation placed by my scribe Yvonne Rivera. I reviewed the scribe's note and agree with the documented findings and plan of care. Any areas of disagreement are noted on the chart. I have personally evaluated this patient. Additional findings are as noted.  I agree with the chief complaint, past medical history, past surgical history, allergies, medications, social and family history as documented unless otherwise noted below.      Electronically signed by Amarilys Eden MD on 2/11/2021 at 3:45 AM

## 2021-02-15 ENCOUNTER — ROUTINE PRENATAL (OUTPATIENT)
Dept: PERINATAL CARE | Age: 31
End: 2021-02-15
Payer: COMMERCIAL

## 2021-02-15 VITALS
DIASTOLIC BLOOD PRESSURE: 71 MMHG | BODY MASS INDEX: 37.76 KG/M2 | WEIGHT: 200 LBS | HEART RATE: 85 BPM | HEIGHT: 61 IN | TEMPERATURE: 98.1 F | SYSTOLIC BLOOD PRESSURE: 120 MMHG | RESPIRATION RATE: 16 BRPM

## 2021-02-15 DIAGNOSIS — O99.412 MATERNAL CARDIOVASCULAR DISEASE AFFECTING PREGNANCY IN SECOND TRIMESTER: Primary | ICD-10-CM

## 2021-02-15 DIAGNOSIS — E05.90 HYPERTHYROIDISM AFFECTING PREGNANCY IN SECOND TRIMESTER: ICD-10-CM

## 2021-02-15 DIAGNOSIS — O99.282 HYPERTHYROIDISM AFFECTING PREGNANCY IN SECOND TRIMESTER: ICD-10-CM

## 2021-02-15 DIAGNOSIS — Z86.79 HISTORY OF PULMONARY HYPERTENSION: ICD-10-CM

## 2021-02-15 DIAGNOSIS — Z36.4 ANTENATAL SCREENING FOR FETAL GROWTH RETARDATION USING ULTRASONICS: ICD-10-CM

## 2021-02-15 DIAGNOSIS — O99.212 OBESITY AFFECTING PREGNANCY IN SECOND TRIMESTER: ICD-10-CM

## 2021-02-15 DIAGNOSIS — O35.2XX0 HEREDITARY FAMILIAL DISEASE AFFECTING MANAGEMENT OF MOTHER AND POSSIBLY AFFECTING FETUS, ANTEPARTUM, SINGLE OR UNSPECIFIED FETUS: ICD-10-CM

## 2021-02-15 DIAGNOSIS — O99.810 ABNORMAL MATERNAL GLUCOSE TOLERANCE, ANTEPARTUM: ICD-10-CM

## 2021-02-15 DIAGNOSIS — Z3A.26 26 WEEKS GESTATION OF PREGNANCY: ICD-10-CM

## 2021-02-15 PROCEDURE — 76827 ECHO EXAM OF FETAL HEART: CPT | Performed by: OBSTETRICS & GYNECOLOGY

## 2021-02-15 PROCEDURE — 76816 OB US FOLLOW-UP PER FETUS: CPT | Performed by: OBSTETRICS & GYNECOLOGY

## 2021-02-15 PROCEDURE — 99999 PR OFFICE/OUTPT VISIT,PROCEDURE ONLY: CPT | Performed by: OBSTETRICS & GYNECOLOGY

## 2021-02-15 PROCEDURE — 93325 DOPPLER ECHO COLOR FLOW MAPG: CPT | Performed by: OBSTETRICS & GYNECOLOGY

## 2021-02-15 PROCEDURE — 76825 ECHO EXAM OF FETAL HEART: CPT | Performed by: OBSTETRICS & GYNECOLOGY

## 2021-02-24 ENCOUNTER — ROUTINE PRENATAL (OUTPATIENT)
Dept: OBGYN CLINIC | Age: 31
End: 2021-02-24

## 2021-02-24 ENCOUNTER — HOSPITAL ENCOUNTER (OUTPATIENT)
Age: 31
Setting detail: SPECIMEN
Discharge: HOME | End: 2021-02-24
Payer: COMMERCIAL

## 2021-02-24 DIAGNOSIS — Z3A.27 27 WEEKS GESTATION OF PREGNANCY: ICD-10-CM

## 2021-02-24 DIAGNOSIS — R79.89 LOW TSH LEVEL: ICD-10-CM

## 2021-02-24 DIAGNOSIS — Z86.79 HISTORY OF PULMONARY HYPERTENSION: ICD-10-CM

## 2021-02-24 DIAGNOSIS — O09.92 HIGH-RISK PREGNANCY IN SECOND TRIMESTER: ICD-10-CM

## 2021-02-24 DIAGNOSIS — Z3A.27 27 WEEKS GESTATION OF PREGNANCY: Primary | ICD-10-CM

## 2021-02-24 DIAGNOSIS — E07.9 THYROID DISORDER: ICD-10-CM

## 2021-02-24 PROBLEM — Z3A.25 25 WEEKS GESTATION OF PREGNANCY: Status: RESOLVED | Noted: 2021-02-10 | Resolved: 2021-02-24

## 2021-02-24 PROCEDURE — 0502F SUBSEQUENT PRENATAL CARE: CPT | Performed by: SPECIALIST

## 2021-02-24 NOTE — PROGRESS NOTES
Patient is completing 1 hour GTT today. Patient was in the office today for a GTT, CBC, TSH. Draw per physician order using sterile technique.   Drawn from the Right antecubital.

## 2021-02-24 NOTE — PROGRESS NOTES
Brian Chiang is a 31 y.o. female 27w6d    [id number]    OB History    Para Term  AB Living   7 4 4   2 4   SAB TAB Ectopic Molar Multiple Live Births   2         4      # Outcome Date GA Lbr Sylvester/2nd Weight Sex Delivery Anes PTL Lv   7 Current            6 SAB            5 Term 14 39w0d  9 lb 13 oz (4.451 kg) M Vag-Spont EPI N SHENG   4 Term 13 38w0d  7 lb 6 oz (3.345 kg) F Vag-Spont EPI N SHENG      Complications: Pulmonary hypertension (HCC)   3 Term 01/05/10 39w0d  6 lb 14 oz (3.118 kg) F Vag-Spont EPI N SHENG   2 SAB            1 Term 10/18/07 39w0d  8 lb 7 oz (3.827 kg) F Vag-Spont EPI N SHENG       Chief Complaint   Patient presents with    Routine Prenatal Visit     Patient is 27 weeks and 6 days gestation and is here for supervision of high risk pregnancy. Patient is completing 1 hour GTT.  High Risk Pregnancy        not currently breastfeeding. The patient was seen and evaluated. There was positive fetal movements. No contractions or leakage of fluid. Signs and symptoms of  labor as well as labor were reviewed. The S/S of Pre-Eclampsia were reviewed with the patient in detail. She is to report any of these if they occur. She currently denies any of these. The patient had her 28 week labs in process. The patient was instructed on fetal kick counts and was given a kick sheet to complete every 8 hours. She was instructed that the baby should move at a minimum of ten times within one hour after a meal. The patient was instructed to lay down on her left side twenty minutes after eating and count movements for up to one hour with a target value of ten movements. She was instructed to notify the office if she did not make that target after two attempts or if after any attempt there was less than four movements. The patient reports that the targets have been made Yes.     Patient Active Problem List    Diagnosis Date Noted    27 weeks gestation of pregnancy 2021  ADHD 01/04/2021     The patient was on Concerta      Fatty liver 06/06/2018     She had elevated LFTs that have normalized. She followed with GI. Work up for Intrinsic hepatic disease was negative. Severe hepatic steatosis seen on Ct on 5/2018      Cyst of right ovary 06/06/2018    History of Thyroid Disorder (G3) 09/03/2014     Per patient she was diagnosed with hyperthyroidism in third pregnancy and was on medications. No issues outside of pregnancy. Not currently on meds.  History of pulmonary hypertension (G3) 09/03/2014     Echo 12/7/20: EF 55%, RV pressure 32 mmg indicated mild pulm HTN     History of Pulmonary HTN in prior pregnancy- normal ECHO in 2014 per patient      Pelvic pain  07/15/2014     Musculoskeletal. Pubic Symphysis strain  Flexeril and pregnancy support belt given 07/15/2014        High-risk pregnancy in second trimester 07/15/2014     Rh+/RI/GBS neg  Quad screen: WNL  Male      Hx of postpartum depression, currently pregnant 07/15/2014        Diagnosis Orders   1. 27 weeks gestation of pregnancy  Glucose tolerance, 1 hour    CBC Auto Differential   2. High-risk pregnancy in second trimester     3. History of pulmonary hypertension (G3)     4. History of Thyroid Disorder (G3)         Patient doing well. Fetal heart rate ausculted at 138 bpm and fundal height is 30 cm. today. Patient advised to continue taking prenatal vitamins and to rest as necessary. Patient requesting permanent tubal sterilization following delivery. Explained to patient that as a [facility] physician, I am no longer able to perform this procedure. The patient will return to the office for her next visit in 1 week. See antepartum flow sheet. Adolfo Wooten am scribing for, and in the presence of Dr. Amarilys Eden. Electronically signed by: Kavin Dickinson 2/24/21 4:08 PM   I agree to the above documentation placed by my scribe Kavin Dickinson.  I reviewed the scribe's note and agree with the documented findings and plan of care. Any areas of disagreement are noted on the chart. I have personally evaluated this patient. Additional findings are as noted. I agree with the chief complaint, past medical history, past surgical history, allergies, medications, social and family history as documented unless otherwise noted below.      Electronically signed by Mahsa Monge MD on 2/25/2021 at 10:31 AM

## 2021-02-25 ENCOUNTER — TELEPHONE (OUTPATIENT)
Dept: OBGYN CLINIC | Age: 31
End: 2021-02-25

## 2021-02-25 DIAGNOSIS — O99.013 ANEMIA AFFECTING PREGNANCY IN THIRD TRIMESTER: ICD-10-CM

## 2021-02-25 DIAGNOSIS — R73.09 ELEVATED GLUCOSE TOLERANCE TEST: Primary | ICD-10-CM

## 2021-02-25 LAB
ABSOLUTE EOS #: 0.17 K/UL (ref 0–0.44)
ABSOLUTE IMMATURE GRANULOCYTE: 0.09 K/UL (ref 0–0.3)
ABSOLUTE LYMPH #: 1.61 K/UL (ref 1.1–3.7)
ABSOLUTE MONO #: 0.78 K/UL (ref 0.1–1.2)
BASOPHILS # BLD: 0 % (ref 0–2)
BASOPHILS ABSOLUTE: 0.03 K/UL (ref 0–0.2)
DIFFERENTIAL TYPE: ABNORMAL
EOSINOPHILS RELATIVE PERCENT: 2 % (ref 1–4)
GLUCOSE ADMINISTRATION: ABNORMAL
GLUCOSE TOLERANCE SCREEN 50G: 159 MG/DL (ref 70–135)
HCT VFR BLD CALC: 33 % (ref 36.3–47.1)
HEMOGLOBIN: 10.7 G/DL (ref 11.9–15.1)
IMMATURE GRANULOCYTES: 1 %
LYMPHOCYTES # BLD: 14 % (ref 24–43)
MCH RBC QN AUTO: 31 PG (ref 25.2–33.5)
MCHC RBC AUTO-ENTMCNC: 32.4 G/DL (ref 28.4–34.8)
MCV RBC AUTO: 95.7 FL (ref 82.6–102.9)
MONOCYTES # BLD: 7 % (ref 3–12)
NRBC AUTOMATED: 0 PER 100 WBC
PDW BLD-RTO: 13.2 % (ref 11.8–14.4)
PLATELET # BLD: 290 K/UL (ref 138–453)
PLATELET ESTIMATE: ABNORMAL
PMV BLD AUTO: 12.1 FL (ref 8.1–13.5)
RBC # BLD: 3.45 M/UL (ref 3.95–5.11)
RBC # BLD: ABNORMAL 10*6/UL
SEG NEUTROPHILS: 76 % (ref 36–65)
SEGMENTED NEUTROPHILS ABSOLUTE COUNT: 8.8 K/UL (ref 1.5–8.1)
TSH SERPL DL<=0.05 MIU/L-ACNC: 0.34 MIU/L (ref 0.3–5)
WBC # BLD: 11.5 K/UL (ref 3.5–11.3)
WBC # BLD: ABNORMAL 10*3/UL

## 2021-02-25 RX ORDER — FERROUS SULFATE 325(65) MG
325 TABLET ORAL 2 TIMES DAILY
Qty: 60 TABLET | Refills: 3 | Status: SHIPPED | OUTPATIENT
Start: 2021-02-25 | End: 2021-05-27

## 2021-02-25 NOTE — TELEPHONE ENCOUNTER
Pt notified of need for 3 hr GTT and will call to schedule  Pt also told to start iron as ordered for low hgb

## 2021-03-02 ENCOUNTER — TELEPHONE (OUTPATIENT)
Dept: OBGYN CLINIC | Age: 31
End: 2021-03-02

## 2021-03-02 NOTE — TELEPHONE ENCOUNTER
Patient reports decreased fetal movement today and vaginal pressure. Patient advised to go to [facility] for further evaluation.

## 2021-03-04 ENCOUNTER — ROUTINE PRENATAL (OUTPATIENT)
Dept: OBGYN CLINIC | Age: 31
End: 2021-03-04

## 2021-03-04 VITALS — BODY MASS INDEX: 38.36 KG/M2 | WEIGHT: 203 LBS | DIASTOLIC BLOOD PRESSURE: 70 MMHG | SYSTOLIC BLOOD PRESSURE: 100 MMHG

## 2021-03-04 DIAGNOSIS — Z3A.29 29 WEEKS GESTATION OF PREGNANCY: ICD-10-CM

## 2021-03-04 DIAGNOSIS — O99.891 HX OF POSTPARTUM DEPRESSION, CURRENTLY PREGNANT: ICD-10-CM

## 2021-03-04 DIAGNOSIS — Z86.79 HISTORY OF PULMONARY HYPERTENSION: ICD-10-CM

## 2021-03-04 DIAGNOSIS — O09.93 HRP (HIGH RISK PREGNANCY), THIRD TRIMESTER: Primary | ICD-10-CM

## 2021-03-04 DIAGNOSIS — Z86.59 HX OF POSTPARTUM DEPRESSION, CURRENTLY PREGNANT: ICD-10-CM

## 2021-03-04 PROCEDURE — 0502F SUBSEQUENT PRENATAL CARE: CPT | Performed by: CLINICAL NURSE SPECIALIST

## 2021-03-04 NOTE — PROGRESS NOTES
Fransisca Pizarro is a 31 y.o. female 29w0d    [id number]    OB History    Para Term  AB Living   7 4 4   2 4   SAB TAB Ectopic Molar Multiple Live Births   2         4      # Outcome Date GA Lbr Sylvester/2nd Weight Sex Delivery Anes PTL Lv   7 Current            6 SAB            5 Term 14 39w0d  9 lb 13 oz (4.451 kg) M Vag-Spont EPI N SHENG   4 Term 13 38w0d  7 lb 6 oz (3.345 kg) F Vag-Spont EPI N SHENG      Complications: Pulmonary hypertension (HCC)   3 Term 01/05/10 39w0d  6 lb 14 oz (3.118 kg) F Vag-Spont EPI N SHENG   2 SAB            1 Term 10/18/07 39w0d  8 lb 7 oz (3.827 kg) F Vag-Spont EPI N SHENG       Blood pressure 100/70, pulse 86, weight 203 lb (92.1 kg), not currently breastfeeding. The patient was seen and evaluated. There was positive fetal movements. No contractions or leakage of fluid. Signs and symptoms of  labor as well as labor were reviewed. The S/S of Pre-Eclampsia were reviewed with the patient in detail. She is to report any of these if they occur. She currently denies any of these. The patient had her 28 week labs completed. The patient was instructed on fetal kick counts and was given a kick sheet to complete every 8 hours. She was instructed that the baby should move at a minimum of ten times within one hour after a meal. The patient was instructed to lay down on her left side twenty minutes after eating and count movements for up to one hour with a target value of ten movements. She was instructed to notify the office if she did not make that target after two attempts or if after any attempt there was less than four movements. The patient reports that the targets have been made Yes. Patient Active Problem List    Diagnosis Date Noted    27 weeks gestation of pregnancy 2021    ADHD 2021     The patient was on Concerta      Fatty liver 2018     She had elevated LFTs that have normalized. She followed with GI.  Work up for Intrinsic hepatic disease was negative. Severe hepatic steatosis seen on Ct on 5/2018      Cyst of right ovary 06/06/2018    History of Thyroid Disorder (G3) 09/03/2014     Per patient she was diagnosed with hyperthyroidism in third pregnancy and was on medications. No issues outside of pregnancy. Not currently on meds.  History of pulmonary hypertension (G3) 09/03/2014     Echo 12/7/20: EF 55%, RV pressure 32 mmg indicated mild pulm HTN     History of Pulmonary HTN in prior pregnancy- normal ECHO in 2014 per patient      Pelvic pain  07/15/2014     Musculoskeletal. Pubic Symphysis strain  Flexeril and pregnancy support belt given 07/15/2014        High-risk pregnancy in second trimester 07/15/2014     Rh+/RI/GBS neg  Quad screen: WNL  Male      Hx of postpartum depression, currently pregnant 07/15/2014        Diagnosis Orders   1. HRP (high risk pregnancy), third trimester     2. Hx of postpartum depression, currently pregnant     3. History of pulmonary hypertension (G3)     4. 29 weeks gestation of pregnancy     Continue prenatal vitamins, increase water intake and frequent rest periods as needed. Fetal kick counts reviewed. Patient encouraged to complete 3 hr GTT and patient states she is going to do it tomorrow. The patient will return to the office for her next visit in 1 weeks. See antepartum flow sheet.      Electronically signed by: Umberto Camarillo CNP

## 2021-03-10 ENCOUNTER — ROUTINE PRENATAL (OUTPATIENT)
Dept: OBGYN CLINIC | Age: 31
End: 2021-03-10

## 2021-03-10 VITALS
DIASTOLIC BLOOD PRESSURE: 64 MMHG | TEMPERATURE: 97.4 F | HEIGHT: 61 IN | BODY MASS INDEX: 38.89 KG/M2 | WEIGHT: 206 LBS | SYSTOLIC BLOOD PRESSURE: 103 MMHG | HEART RATE: 79 BPM

## 2021-03-10 DIAGNOSIS — Z3A.29 29 WEEKS GESTATION OF PREGNANCY: ICD-10-CM

## 2021-03-10 DIAGNOSIS — O09.93 ENCOUNTER FOR SUPERVISION OF HIGH RISK PREGNANCY IN THIRD TRIMESTER, ANTEPARTUM: Primary | ICD-10-CM

## 2021-03-10 DIAGNOSIS — Z86.79 HISTORY OF PULMONARY HYPERTENSION: ICD-10-CM

## 2021-03-10 DIAGNOSIS — E07.9 THYROID DISORDER: ICD-10-CM

## 2021-03-10 PROBLEM — Z3A.27 27 WEEKS GESTATION OF PREGNANCY: Status: RESOLVED | Noted: 2021-02-24 | Resolved: 2021-03-10

## 2021-03-10 PROCEDURE — 0502F SUBSEQUENT PRENATAL CARE: CPT | Performed by: CLINICAL NURSE SPECIALIST

## 2021-03-10 NOTE — PROGRESS NOTES
Cam Cook is a 31 y.o. female 33w8d    [id number]    OB History    Para Term  AB Living   7 4 4   2 4   SAB TAB Ectopic Molar Multiple Live Births   2         4      # Outcome Date GA Lbr Sylvester/2nd Weight Sex Delivery Anes PTL Lv   7 Current            6 SAB            5 Term 14 39w0d  9 lb 13 oz (4.451 kg) M Vag-Spont EPI N SHENG   4 Term 13 38w0d  7 lb 6 oz (3.345 kg) F Vag-Spont EPI N SHENG      Complications: Pulmonary hypertension (HCC)   3 Term 01/05/10 39w0d  6 lb 14 oz (3.118 kg) F Vag-Spont EPI N SHENG   2 SAB            1 Term 10/18/07 39w0d  8 lb 7 oz (3.827 kg) F Vag-Spont EPI N SHENG       Blood pressure 103/64, pulse 79, temperature 97.4 °F (36.3 °C), temperature source Temporal, height 5' 1\" (1.549 m), weight 206 lb (93.4 kg), not currently breastfeeding. The patient was seen and evaluated. There was positive fetal movements. No contractions or leakage of fluid. Signs and symptoms of  labor as well as labor were reviewed. The S/S of Pre-Eclampsia were reviewed with the patient in detail. She is to report any of these if they occur. She currently denies any of these. The patient had her 28 week labs ordered. The patient was instructed on fetal kick counts and was given a kick sheet to complete every 8 hours. She was instructed that the baby should move at a minimum of ten times within one hour after a meal. The patient was instructed to lay down on her left side twenty minutes after eating and count movements for up to one hour with a target value of ten movements. She was instructed to notify the office if she did not make that target after two attempts or if after any attempt there was less than four movements. The patient reports that the targets have been made Yes. Patient Active Problem List    Diagnosis Date Noted    ADHD 2021     The patient was on Concerta      Fatty liver 2018     She had elevated LFTs that have normalized.  She followed with GI. Work up for Intrinsic hepatic disease was negative. Severe hepatic steatosis seen on Ct on 5/2018      Cyst of right ovary 06/06/2018    History of Thyroid Disorder (G3) 09/03/2014     Per patient she was diagnosed with hyperthyroidism in third pregnancy and was on medications. No issues outside of pregnancy. Not currently on meds.  History of pulmonary hypertension (G3) 09/03/2014     Echo 12/7/20: EF 55%, RV pressure 32 mmg indicated mild pulm HTN     History of Pulmonary HTN in prior pregnancy- normal ECHO in 2014 per patient      Pelvic pain  07/15/2014     Musculoskeletal. Pubic Symphysis strain  Flexeril and pregnancy support belt given 07/15/2014        High-risk pregnancy in second trimester 07/15/2014     Rh+/RI/GBS neg  Quad screen: WNL  Male      Hx of postpartum depression, currently pregnant 07/15/2014        Diagnosis Orders   1. Encounter for supervision of high risk pregnancy in third trimester, antepartum     2. 29 weeks gestation of pregnancy     3. History of pulmonary hypertension (G3)     4. History of Thyroid Disorder (G3)     Continue prenatal vitamins, increase water intake and frequent rest periods as needed. Fetal kick counts reviewed. Discussed with patient completing her 3 hr GTT and patient states she will do it this weekend. The patient will return to the office for her next visit in 1 weeks. See antepartum flow sheet.      Electronically signed by: Joyce Christianson CNP

## 2021-03-17 ENCOUNTER — ROUTINE PRENATAL (OUTPATIENT)
Dept: OBGYN CLINIC | Age: 31
End: 2021-03-17

## 2021-03-17 VITALS
DIASTOLIC BLOOD PRESSURE: 53 MMHG | WEIGHT: 207 LBS | HEART RATE: 73 BPM | TEMPERATURE: 97.2 F | SYSTOLIC BLOOD PRESSURE: 108 MMHG | BODY MASS INDEX: 39.11 KG/M2

## 2021-03-17 DIAGNOSIS — E07.9 THYROID DISORDER: ICD-10-CM

## 2021-03-17 DIAGNOSIS — O09.93 HIGH-RISK PREGNANCY IN THIRD TRIMESTER: ICD-10-CM

## 2021-03-17 DIAGNOSIS — Z86.79 HISTORY OF PULMONARY HYPERTENSION: ICD-10-CM

## 2021-03-17 DIAGNOSIS — Z3A.30 30 WEEKS GESTATION OF PREGNANCY: Primary | ICD-10-CM

## 2021-03-17 PROCEDURE — 0502F SUBSEQUENT PRENATAL CARE: CPT | Performed by: SPECIALIST

## 2021-03-17 NOTE — PATIENT INSTRUCTIONS
Patient Education        Weeks 30 to 28 of Your Pregnancy: Care Instructions  Overview     You've made it to the final months of your pregnancy! By now your baby is really starting to look like a baby, with hair and plump skin. As you enter the final weeks of pregnancy, the reality of having a baby may start to set in. This is a good time to set up a safe nursery and find quality  if needed. Doing this stuff ahead of time will allow you to focus on caring for and enjoying your new baby. You may also want to take a tour of your hospital's labor and delivery unit. This will help you get a better idea of what to expect while you're in the hospital.  During these last months, be sure to take good care of yourself. Pay attention to what your body needs. If your doctor says it's okay for you to work, don't push yourself too hard. If you haven't already had the Tdap shot during this pregnancy, talk to your doctor about getting it. It will help protect your  against pertussis infection. Follow-up care is a key part of your treatment and safety. Be sure to make and go to all appointments, and call your doctor if you are having problems. It's also a good idea to know your test results and keep a list of the medicines you take. How can you care for yourself at home? Pay attention to your baby's movements  · You should feel your baby move several times every day. · Your baby now turns less, and kicks and jabs more. · Your baby sleeps 20 to 45 minutes at a time and is more active at certain times of day. · If your doctor wants you to count your baby's kicks:  ? Empty your bladder, and lie on your side or relax in a comfortable chair. ? Write down your start time. ? Pay attention only to your baby's movements. Count any movement except hiccups. ? After you have counted 10 movements, write down your stop time. ? Write down how many minutes it took for your baby to move 10 times.   ? If an hour goes by and you have not recorded 10 movements, have something to eat or drink and then count for another hour. If you do not record 10 movements in either hour, call your doctor. Ease heartburn  · Eat small, frequent meals. · Do not eat chocolate, peppermint, or very spicy foods. Avoid drinks with caffeine, such as coffee, tea, and sodas. · Avoid bending over or lying down after meals. · Talk a short walk after you eat. · If heartburn is a problem at night, do not eat for 2 hours before bedtime. · Take antacids like Mylanta, Maalox, Rolaids, or Tums. Do not take antacids that have sodium bicarbonate. Care for varicose veins  · Varicose veins are blood vessels that stretch out with the extra blood during pregnancy. Your legs may ache or throb. Most varicose veins will go away after the birth. · Avoid standing for long periods of time. Sit with your legs crossed at the ankles, not the knees. · Sit with your feet propped up. · Avoid tight clothing or stockings. Wear support hose. · Exercise regularly. Try walking for at least 30 minutes a day. Where can you learn more? Go to https://Searchspace.[employer]. org and sign in to your [employer] account. Enter V690 in the [facility] box to learn more about \"Weeks 30 to 32 of Your Pregnancy: Care Instructions. \"     If you do not have an account, please click on the \"Sign Up Now\" link. Current as of: October 8, 2020               Content Version: 12.8  © 2006-2021 [employer]. Care instructions adapted under license by [facility] ([facility]). If you have questions about a medical condition or this instruction, always ask your healthcare professional. [place] [address] any warranty or liability for your use of this information. Patient Education        Learning About When to Call Your Doctor During Pregnancy (After 20 Weeks)  Your Care Instructions  It's common to have concerns about what might be a problem during pregnancy. your labor becomes more active. Symptoms of active labor include:  · Contractions that are regular. · Contractions that are less than 5 minutes apart. · Contractions that are hard to talk through. Follow-up care is a key part of your treatment and safety. Be sure to make and go to all appointments, and call your doctor if you are having problems. It's also a good idea to know your test results and keep a list of the medicines you take. Where can you learn more? Go to https://[employer]. org and sign in to your [employer] account. Enter  in the [employer] box to learn more about \"Learning About When to Call Your Doctor During Pregnancy (After 20 Weeks). \"     If you do not have an account, please click on the \"Sign Up Now\" link. Current as of: October 8, 2020               Content Version: 12.8  © 2006-2021 [employer]. Care instructions adapted under license by [facility] ([facility]). If you have questions about a medical condition or this instruction, always ask your healthcare professional. [address] any warranty or liability for your use of this information.

## 2021-03-17 NOTE — PROGRESS NOTES
Juan Antonio Lyle is a 31 y.o. female 30w6d    [id number]    OB History    Para Term  AB Living   7 4 4   2 4   SAB TAB Ectopic Molar Multiple Live Births   2         4      # Outcome Date GA Lbr Sylvester/2nd Weight Sex Delivery Anes PTL Lv   7 Current            6 SAB            5 Term 14 39w0d  9 lb 13 oz (4.451 kg) M Vag-Spont EPI N SHENG   4 Term 13 38w0d  7 lb 6 oz (3.345 kg) F Vag-Spont EPI N SHENG      Complications: Pulmonary hypertension (HCC)   3 Term 01/05/10 39w0d  6 lb 14 oz (3.118 kg) F Vag-Spont EPI N SHENG   2 SAB            1 Term 10/18/07 39w0d  8 lb 7 oz (3.827 kg) F Vag-Spont EPI N SHENG       Chief Complaint   Patient presents with    Routine Prenatal Visit        Blood pressure (!) 108/53, pulse 73, temperature 97.2 °F (36.2 °C), weight 207 lb (93.9 kg), not currently breastfeeding. The patient was seen and evaluated. There was positive fetal movements. No contractions or leakage of fluid. Signs and symptoms of  labor as well as labor were reviewed. The S/S of Pre-Eclampsia were reviewed with the patient in detail. She is to report any of these if they occur. She currently denies any of these. The patient had her 28 week labs completed. The patient was instructed on fetal kick counts and was given a kick sheet to complete every 8 hours. She was instructed that the baby should move at a minimum of ten times within one hour after a meal. The patient was instructed to lay down on her left side twenty minutes after eating and count movements for up to one hour with a target value of ten movements. She was instructed to notify the office if she did not make that target after two attempts or if after any attempt there was less than four movements. The patient reports that the targets have been made Yes.     Patient Active Problem List    Diagnosis Date Noted    30 weeks gestation of pregnancy 2021    ADHD 2021     The patient was on Concerta      Fatty liver 06/06/2018     She had elevated LFTs that have normalized. She followed with GI. Work up for Intrinsic hepatic disease was negative. Severe hepatic steatosis seen on Ct on 5/2018      Cyst of right ovary 06/06/2018    History of Thyroid Disorder (G3) 09/03/2014     Per patient she was diagnosed with hyperthyroidism in third pregnancy and was on medications. No issues outside of pregnancy. Not currently on meds.  History of pulmonary hypertension (G3) 09/03/2014     Echo 12/7/20: EF 55%, RV pressure 32 mmg indicated mild pulm HTN     History of Pulmonary HTN in prior pregnancy- normal ECHO in 2014 per patient      Pelvic pain  07/15/2014     Musculoskeletal. Pubic Symphysis strain  Flexeril and pregnancy support belt given 07/15/2014        High-risk pregnancy in third trimester 07/15/2014     Rh+/RI/GBS neg  Quad screen: WNL  Male      Hx of postpartum depression, currently pregnant 07/15/2014        Diagnosis Orders   1. 30 weeks gestation of pregnancy     2. History of Thyroid Disorder (G3)     3. History of pulmonary hypertension (G3)     4. High-risk pregnancy in third trimester         Patient has not had 3 hour testing done because her baby shower was last weekend. She states that she plans to get it done this coming weekend. She is scheduled to see [facility] for ultrasound. She also states that she was told everything is ok regarding her pulmonary hypertension and she was told to follow up with her cardiologist.  Fetal heart rate ausculted at 137 bpm and fundal height is 33 cm. today. Patient advised to continue taking prenatal vitamins and to rest as necessary. The patient will return to the office for her next visit in 1 week. See antepartum flow sheet. Edi Sherwood am scribing for, and in the presence of Dr. Venkatesh Escobedo.    Electronically signed by: Herber Callaway 3/17/21 4:07 PM

## 2021-03-21 ENCOUNTER — HOSPITAL ENCOUNTER (OUTPATIENT)
Age: 31
Discharge: HOME | End: 2021-03-21
Payer: COMMERCIAL

## 2021-03-21 LAB
3 HR GLUCOSE: 141 MG/DL (ref 65–139)
AMOUNT GLUCOSE GIVEN: 100 G
GLUCOSE FASTING: 74 MG/DL (ref 65–94)
GLUCOSE TOLERANCE TEST 1 HOUR: 193 MG/DL (ref 65–179)
GLUCOSE TOLERANCE TEST 2 HOUR: 195 MG/DL (ref 65–154)

## 2021-03-21 PROCEDURE — 36415 COLL VENOUS BLD VENIPUNCTURE: CPT

## 2021-03-21 PROCEDURE — 82951 GLUCOSE TOLERANCE TEST (GTT): CPT

## 2021-03-21 PROCEDURE — 82952 GTT-ADDED SAMPLES: CPT

## 2021-03-22 DIAGNOSIS — O24.410 DIET CONTROLLED GESTATIONAL DIABETES MELLITUS (GDM) IN THIRD TRIMESTER: Primary | ICD-10-CM

## 2021-03-23 ENCOUNTER — TELEPHONE (OUTPATIENT)
Dept: OBGYN CLINIC | Age: 31
End: 2021-03-23

## 2021-03-23 ENCOUNTER — TELEPHONE (OUTPATIENT)
Dept: PERINATAL CARE | Age: 31
End: 2021-03-23

## 2021-03-23 DIAGNOSIS — O99.810 ABNORMAL MATERNAL GLUCOSE TOLERANCE, ANTEPARTUM: Primary | ICD-10-CM

## 2021-03-23 NOTE — TELEPHONE ENCOUNTER
Pt notified of need to monitor blood sugars and requested supplies be called into [facility] at [phone]. Writer called into above pharmacy, [facility], glucometer, test strips, lancets and alcohol pads, whatever her health care covers, to monitor blood sugars 4x's/day- FBS and 2hrs after each meal, 1mos supply and 2 refills.

## 2021-03-25 ENCOUNTER — ROUTINE PRENATAL (OUTPATIENT)
Dept: PERINATAL CARE | Age: 31
End: 2021-03-25
Payer: COMMERCIAL

## 2021-03-25 ENCOUNTER — ROUTINE PRENATAL (OUTPATIENT)
Dept: OBGYN CLINIC | Age: 31
End: 2021-03-25
Payer: COMMERCIAL

## 2021-03-25 VITALS
TEMPERATURE: 98 F | HEART RATE: 90 BPM | BODY MASS INDEX: 39.15 KG/M2 | WEIGHT: 207.2 LBS | DIASTOLIC BLOOD PRESSURE: 57 MMHG | SYSTOLIC BLOOD PRESSURE: 90 MMHG

## 2021-03-25 VITALS
DIASTOLIC BLOOD PRESSURE: 64 MMHG | BODY MASS INDEX: 39.08 KG/M2 | SYSTOLIC BLOOD PRESSURE: 114 MMHG | TEMPERATURE: 97.2 F | HEIGHT: 61 IN | RESPIRATION RATE: 20 BRPM | HEART RATE: 70 BPM | WEIGHT: 207 LBS

## 2021-03-25 DIAGNOSIS — Z3A.32 32 WEEKS GESTATION OF PREGNANCY: ICD-10-CM

## 2021-03-25 DIAGNOSIS — E07.9 THYROID DISORDER: ICD-10-CM

## 2021-03-25 DIAGNOSIS — O24.410 DIET CONTROLLED GESTATIONAL DIABETES MELLITUS (GDM), ANTEPARTUM: Primary | ICD-10-CM

## 2021-03-25 DIAGNOSIS — O24.410 DIET CONTROLLED GESTATIONAL DIABETES MELLITUS (GDM) IN THIRD TRIMESTER: ICD-10-CM

## 2021-03-25 DIAGNOSIS — O09.93 ENCOUNTER FOR SUPERVISION OF HIGH RISK PREGNANCY IN THIRD TRIMESTER, ANTEPARTUM: Primary | ICD-10-CM

## 2021-03-25 DIAGNOSIS — Z86.79 HISTORY OF PULMONARY HYPERTENSION: ICD-10-CM

## 2021-03-25 LAB
ACCELERATIONS > 10BPM: NORMAL
ACCELERATIONS > 15 BPM: 3
ACOUSTIC STIMULATION: NO
DECELERATIONS: NORMAL
FHR VARIABILITIES: NORMAL
NST ASSESSMENT: REACTIVE
NST BASELINE: 145
NST DURATION: 20 MINUTES
NST INDICATIONS: NORMAL
NST LOCATIONS: NORMAL
NST READ BY: NORMAL
NST RETURN: NORMAL
UTERINE ACTIVITY: NO

## 2021-03-25 PROCEDURE — 0502F SUBSEQUENT PRENATAL CARE: CPT | Performed by: CLINICAL NURSE SPECIALIST

## 2021-03-25 PROCEDURE — 59025 FETAL NON-STRESS TEST: CPT | Performed by: CLINICAL NURSE SPECIALIST

## 2021-03-25 PROCEDURE — 99242 OFF/OP CONSLTJ NEW/EST SF 20: CPT | Performed by: OBSTETRICS & GYNECOLOGY

## 2021-03-25 RX ORDER — BLOOD-GLUCOSE METER
EACH MISCELLANEOUS
Status: ON HOLD | COMMUNITY
Start: 2021-03-23 | End: 2021-05-16 | Stop reason: HOSPADM

## 2021-03-25 RX ORDER — LANCETS 33 GAUGE
EACH MISCELLANEOUS
Status: ON HOLD | COMMUNITY
Start: 2021-03-23 | End: 2021-05-16 | Stop reason: HOSPADM

## 2021-03-25 RX ORDER — BLOOD SUGAR DIAGNOSTIC
STRIP MISCELLANEOUS
Status: ON HOLD | COMMUNITY
Start: 2021-03-23 | End: 2021-05-16 | Stop reason: HOSPADM

## 2021-03-25 RX ORDER — DEXTROSE 4 G
TABLET,CHEWABLE ORAL
Status: ON HOLD | COMMUNITY
Start: 2021-03-23 | End: 2021-05-16 | Stop reason: HOSPADM

## 2021-03-25 NOTE — PROGRESS NOTES
Cam Cook is a 31 y.o. female 32w0d    [id number]    OB History    Para Term  AB Living   7 4 4   2 4   SAB TAB Ectopic Molar Multiple Live Births   2         4      # Outcome Date GA Lbr Sylvester/2nd Weight Sex Delivery Anes PTL Lv   7 Current            6 SAB            5 Term 14 39w0d  9 lb 13 oz (4.451 kg) M Vag-Spont EPI N SHENG   4 Term 13 38w0d  7 lb 6 oz (3.345 kg) F Vag-Spont EPI N SHENG      Complications: Pulmonary hypertension (HCC)   3 Term 01/05/10 39w0d  6 lb 14 oz (3.118 kg) F Vag-Spont EPI N SHENG   2 SAB            1 Term 10/18/07 39w0d  8 lb 7 oz (3.827 kg) F Vag-Spont EPI N SHENG       Blood pressure (!) 90/57, pulse 90, temperature 98 °F (36.7 °C), temperature source Temporal, weight 207 lb 3.2 oz (94 kg), not currently breastfeeding. The patient was seen and evaluated. There was positive fetal movements. No contractions or leakage of fluid. Signs and symptoms of  labor as well as labor were reviewed. The S/S of Pre-Eclampsia were reviewed with the patient in detail. She is to report any of these if they occur. She currently denies any of these. The patient had her 28 week labs completed. The patient was instructed on fetal kick counts and was given a kick sheet to complete every 8 hours. She was instructed that the baby should move at a minimum of ten times within one hour after a meal. The patient was instructed to lay down on her left side twenty minutes after eating and count movements for up to one hour with a target value of ten movements. She was instructed to notify the office if she did not make that target after two attempts or if after any attempt there was less than four movements. The patient reports that the targets have been made Yes.     Patient Active Problem List    Diagnosis Date Noted    Diet controlled gestational diabetes mellitus (GDM) in third trimester 2021    32 weeks gestation of pregnancy 2021    30 weeks gestation of pregnancy 03/17/2021    ADHD 01/04/2021     The patient was on Concerta      Fatty liver 06/06/2018     She had elevated LFTs that have normalized. She followed with GI. Work up for Intrinsic hepatic disease was negative. Severe hepatic steatosis seen on Ct on 5/2018      Cyst of right ovary 06/06/2018    History of Thyroid Disorder (G3) 09/03/2014     Per patient she was diagnosed with hyperthyroidism in third pregnancy and was on medications. No issues outside of pregnancy. Not currently on meds.  History of pulmonary hypertension (G3) 09/03/2014     Echo 12/7/20: EF 55%, RV pressure 32 mmg indicated mild pulm HTN     History of Pulmonary HTN in prior pregnancy- normal ECHO in 2014 per patient      Pelvic pain  07/15/2014     Musculoskeletal. Pubic Symphysis strain  Flexeril and pregnancy support belt given 07/15/2014        High-risk pregnancy in third trimester 07/15/2014     Rh+/RI/GBS neg  Quad screen: WNL  Male      Hx of postpartum depression, currently pregnant 07/15/2014        Diagnosis Orders   1. Encounter for supervision of high risk pregnancy in third trimester, antepartum     2. History of Thyroid Disorder (G3)     3. History of pulmonary hypertension (G3)     4. 32 weeks gestation of pregnancy     5. Diet controlled gestational diabetes mellitus (GDM) in third trimester     Continue prenatal vitamins, increase water intake and frequent rest periods as needed. Fetal kick counts reviewed. The patient will return to the office for her next visit in 1 weeks. See antepartum flow sheet.      Electronically signed by: Alana Dominguez CNP

## 2021-03-29 ENCOUNTER — ROUTINE PRENATAL (OUTPATIENT)
Dept: PERINATAL CARE | Age: 31
End: 2021-03-29
Payer: COMMERCIAL

## 2021-03-29 VITALS
WEIGHT: 207 LBS | TEMPERATURE: 97.2 F | HEIGHT: 61 IN | BODY MASS INDEX: 39.08 KG/M2 | DIASTOLIC BLOOD PRESSURE: 60 MMHG | RESPIRATION RATE: 16 BRPM | HEART RATE: 76 BPM | SYSTOLIC BLOOD PRESSURE: 98 MMHG

## 2021-03-29 DIAGNOSIS — O99.413 MATERNAL CARDIOVASCULAR DISEASE AFFECTING PREGNANCY IN THIRD TRIMESTER: ICD-10-CM

## 2021-03-29 DIAGNOSIS — E05.90 HYPERTHYROIDISM AFFECTING PREGNANCY IN THIRD TRIMESTER: ICD-10-CM

## 2021-03-29 DIAGNOSIS — O99.213 OBESITY AFFECTING PREGNANCY IN THIRD TRIMESTER: ICD-10-CM

## 2021-03-29 DIAGNOSIS — O99.283 HYPERTHYROIDISM AFFECTING PREGNANCY IN THIRD TRIMESTER: ICD-10-CM

## 2021-03-29 DIAGNOSIS — Z36.4 ANTENATAL SCREENING FOR FETAL GROWTH RETARDATION USING ULTRASONICS: ICD-10-CM

## 2021-03-29 DIAGNOSIS — Z3A.32 32 WEEKS GESTATION OF PREGNANCY: ICD-10-CM

## 2021-03-29 DIAGNOSIS — O35.2XX0 HEREDITARY FAMILIAL DISEASE AFFECTING MANAGEMENT OF MOTHER AND POSSIBLY AFFECTING FETUS, ANTEPARTUM, SINGLE OR UNSPECIFIED FETUS: ICD-10-CM

## 2021-03-29 DIAGNOSIS — O24.410 DIET CONTROLLED GESTATIONAL DIABETES MELLITUS (GDM), ANTEPARTUM: Primary | ICD-10-CM

## 2021-03-29 DIAGNOSIS — Z13.89 ENCOUNTER FOR ROUTINE SCREENING FOR MALFORMATION USING ULTRASONICS: ICD-10-CM

## 2021-03-29 PROCEDURE — 76819 FETAL BIOPHYS PROFIL W/O NST: CPT | Performed by: OBSTETRICS & GYNECOLOGY

## 2021-03-29 PROCEDURE — 76805 OB US >/= 14 WKS SNGL FETUS: CPT | Performed by: OBSTETRICS & GYNECOLOGY

## 2021-03-29 NOTE — PROGRESS NOTES
Please refer to attached ultrasound report for doctor's evaluation of the clinical information obtained by vital signs, ultrasound, and/or non-stress test along with management recommendation. 100

## 2021-03-31 ENCOUNTER — HOSPITAL ENCOUNTER (OUTPATIENT)
Dept: DIABETES SERVICES | Age: 31
Setting detail: THERAPIES SERIES
Discharge: HOME | End: 2021-03-31
Payer: COMMERCIAL

## 2021-03-31 PROCEDURE — G0108 DIAB MANAGE TRN  PER INDIV: HCPCS

## 2021-03-31 RX ORDER — PROMETHAZINE HYDROCHLORIDE 25 MG/1
25 TABLET ORAL EVERY 6 HOURS PRN
COMMUNITY

## 2021-03-31 NOTE — PROGRESS NOTES
.Gestational Diabetes Education Progress Note - PHONE  This visit is a TELEPHONE encounter (During [place]SCQ-10 public health emergency). Pursuant to the emergency declaration under the [facility], [address] [place] [address] authority and the [employer] Act, this TELEPHONE Visit was conducted with patient's (and/or legal guardian's) consent, to reduce the patient's risk of exposure to COVID-19 and provide necessary medical care. The patient (and/or legal guardian) has also been advised to contact this office for worsening conditions or problems, and seek emergency medical treatment and/or call 911 if deemed necessary. Patient identification was verified at the start of the visit: Yes    Total time spent for this encounter: 1 hour    Services were provided through a video synchronous discussion virtually to substitute for in-person clinic visit. Patient and provider were located at their individual home/office. 3/31/21CB 1 hour by phone    Assessment of learning needs and self care was done - see Gestational Diabetes Screening. This is new for patient and 7th pregnancy with 4 live births. 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 Teaching provided at this session included:   _X__ Definition of gestational diabetes    _X_ Risk factors   _X__ Effects on pregnancy       _ X_ Management   _X__ Self-monitoring of blood sugar (FBS and 2 hrs postprandial)   _X__ Blood sugar targets FBS 65-90 and <120 2 hrs postprandial)   _X__  Role of Insulins and use of injection methods  _x__ Pen   __x_ Marry Haver    _x___Hyperglycemia  _x__ Hypoglycemia and treatment                X      Exercise Not much activity due to Pelvic pain so uncomfortable to take walks. Does make a point to get up and move frequently. Meal planning:   _X_  Avoid fruit juice and sugary beverages.   Patient was having cravings for sweets with pregnancy and eating cupcakes and drinking 1 can reg pop/d but has since stopped after informed of her GTT.               _X_  Aim to eat small frequent meals and snacks. (ie., 3 + 3)                _X__  Eat balanced meals according to divided dinner plate sample            Planned follow-up:    x__  Another appointment /  phone call support has been scheduled for RD on 4/21/21 @ 3:30p             _X_ Patient is to report blood glucose test results to physician as directed by  prescribing physician. Resource materials provided via mail prior to call includes:  Patient had not received packet in mail yet. Gestational Diabetes Mellitus ( GDM) toolkit form [place] gestational diabetes postpartum care learning collaborative 2018. Gestational diabetes handout from [facility] jan 2016  \"Simple Guidelines for meal planning with gestational diabetes\" handout 3 meals and 3 snacks  SMBG sheets to fax back to MFM weekly  BD  healthy injection site selection and rotation with 6 mm insulin syringe and 4 mm pen needle  Did you have gestational diabetes when you were pregnant? Handout from [facility]  April 2014      Patient has set goal for practice of diabetes self management :  SMBG- check fasting and 2 hours PP, eat 3 meals and 3 snacks/day, avoid sugary beverages. Patient is having to pay $70 for test strips for 100 and is testing 4x/d and did not get enough to last the month. Also patient has the [facility] 14 d CGMS but needs Rx for the sensors. Patient will check with either OB or Dr. Sue Conley office for more test strips or Rx for sensors. Patient stated has use of a home BG meter and has /  has not started to test BG. Self report BG are BG mostly under 90 now and 120 since stopping pop and cupcakes    Patient stated understanding of role of insulin in care of gestational diabetes and if needs support to start to use insulin follow up care will be planned. Reviewed set up of pen by phone .  Patient states she is familiar with the insulin injections.     Liana Villegas RN

## 2021-04-08 ENCOUNTER — ROUTINE PRENATAL (OUTPATIENT)
Dept: OBGYN CLINIC | Age: 31
End: 2021-04-08
Payer: COMMERCIAL

## 2021-04-08 VITALS
HEART RATE: 80 BPM | BODY MASS INDEX: 38.43 KG/M2 | WEIGHT: 203.4 LBS | TEMPERATURE: 98.2 F | SYSTOLIC BLOOD PRESSURE: 125 MMHG | DIASTOLIC BLOOD PRESSURE: 80 MMHG

## 2021-04-08 DIAGNOSIS — E07.9 THYROID DISORDER: ICD-10-CM

## 2021-04-08 DIAGNOSIS — O09.93 ENCOUNTER FOR SUPERVISION OF HIGH RISK PREGNANCY IN THIRD TRIMESTER, ANTEPARTUM: ICD-10-CM

## 2021-04-08 DIAGNOSIS — Z3A.32 32 WEEKS GESTATION OF PREGNANCY: ICD-10-CM

## 2021-04-08 DIAGNOSIS — Z86.79 HISTORY OF PULMONARY HYPERTENSION: ICD-10-CM

## 2021-04-08 LAB
ACCELERATIONS > 10BPM: NORMAL
ACCELERATIONS > 15 BPM: 2
ACOUSTIC STIMULATION: NO
DECELERATIONS: NORMAL
FHR VARIABILITIES: NORMAL
NST ASSESSMENT: REACTIVE
NST BASELINE: 150
NST DURATION: 20 MINUTES
NST INDICATIONS: NORMAL
NST LOCATIONS: NORMAL
NST READ BY: NORMAL
NST RETURN: NORMAL
UTERINE ACTIVITY: NO

## 2021-04-08 PROCEDURE — 0502F SUBSEQUENT PRENATAL CARE: CPT | Performed by: CLINICAL NURSE SPECIALIST

## 2021-04-08 PROCEDURE — 59025 FETAL NON-STRESS TEST: CPT | Performed by: CLINICAL NURSE SPECIALIST

## 2021-04-08 NOTE — PROGRESS NOTES
Anahy Sandoval is a 31 y.o. female 34w0d, patient complains of contractions that she had more than 4 an hour and some she had to breathe through but she did not call the office or the on call physician. Patient also reports SOB when going up step or when she has to much exertion more than normal for her over the past few days. [id number]    OB History    Para Term  AB Living   7 4 4   2 4   SAB TAB Ectopic Molar Multiple Live Births   2         4      # Outcome Date GA Lbr Sylvester/2nd Weight Sex Delivery Anes PTL Lv   7 Current            6 SAB            5 Term 14 39w0d  9 lb 13 oz (4.451 kg) M Vag-Spont EPI N SHENG   4 Term 13 38w0d  7 lb 6 oz (3.345 kg) F Vag-Spont EPI N SHENG      Complications: Pulmonary hypertension (HCC)   3 Term 01/05/10 39w0d  6 lb 14 oz (3.118 kg) F Vag-Spont EPI N SHENG   2 SAB            1 Term 10/18/07 39w0d  8 lb 7 oz (3.827 kg) F Vag-Spont EPI N SHENG       Blood pressure 125/80, pulse 80, temperature 98.2 °F (36.8 °C), temperature source Temporal, weight 203 lb 6.4 oz (92.3 kg), not currently breastfeeding. The patient was seen and evaluated. There was positive fetal movements. No contractions or leakage of fluid. Signs and symptoms of  labor as well as labor were reviewed. The S/S of Pre-Eclampsia were reviewed with the patient in detail. She is to report any of these if they occur. She currently denies any of these. The patient had her 28 week labs completed. The patient was instructed on fetal kick counts and was given a kick sheet to complete every 8 hours. She was instructed that the baby should move at a minimum of ten times within one hour after a meal. The patient was instructed to lay down on her left side twenty minutes after eating and count movements for up to one hour with a target value of ten movements.   She was instructed to notify the office if she did not make that target after two attempts or if after any attempt there was less than four movements. The patient reports that the targets have been made Yes. Patient Active Problem List    Diagnosis Date Noted    Diet controlled gestational diabetes mellitus (GDM) in third trimester 2021    32 weeks gestation of pregnancy 2021    30 weeks gestation of pregnancy 2021    ADHD 2021     The patient was on Concerta      Fatty liver 2018     She had elevated LFTs that have normalized. She followed with GI. Work up for Intrinsic hepatic disease was negative. Severe hepatic steatosis seen on Ct on 2018      Cyst of right ovary 2018    History of Thyroid Disorder (G3) 2014     Per patient she was diagnosed with hyperthyroidism in third pregnancy and was on medications. No issues outside of pregnancy. Not currently on meds.  History of pulmonary hypertension (G3) 2014     Echo 20: EF 55%, RV pressure 32 mmg indicated mild pulm HTN     History of Pulmonary HTN in prior pregnancy- normal ECHO in  per patient      Pelvic pain  07/15/2014     Musculoskeletal. Pubic Symphysis strain  Flexeril and pregnancy support belt given 07/15/2014        High-risk pregnancy in third trimester 07/15/2014     Rh+/RI/GBS neg  Quad screen: WNL  Male      Hx of postpartum depression, currently pregnant 07/15/2014        Diagnosis Orders   1. Encounter for supervision of high risk pregnancy in third trimester, antepartum  Fetal nonstress test    [facility] Vijay See MD, Maternal Fetal Medicine, [place]   2. History of Thyroid Disorder (G3)  Fetal nonstress test   3. History of pulmonary hypertension (G3)  Fetal nonstress test    [facility] Rosie Edge MD, Maternal Fetal Medicine, [place]   4. 32 weeks gestation of pregnancy  Fetal nonstress test   Continue prenatal vitamins, increase water intake and frequent rest periods as needed. Fetal kick counts reviewed.   Patient instructed to call the office/on call physician with any  labor or go to [address] and patient verbalized understanding  Patient reports she does not have a follow up with Dr. Caterina Gambino until the end of April and she is having SOB with climbing steps moreso than ever before    The patient will return to the office for her next visit in 1 weeks. See antepartum flow sheet.      Electronically signed by: Daniel Joseph CNP

## 2021-04-09 ENCOUNTER — TELEPHONE (OUTPATIENT)
Dept: OBGYN CLINIC | Age: 31
End: 2021-04-09

## 2021-04-15 ENCOUNTER — ROUTINE PRENATAL (OUTPATIENT)
Dept: OBGYN CLINIC | Age: 31
End: 2021-04-15
Payer: COMMERCIAL

## 2021-04-15 VITALS
BODY MASS INDEX: 38.73 KG/M2 | WEIGHT: 205 LBS | DIASTOLIC BLOOD PRESSURE: 73 MMHG | SYSTOLIC BLOOD PRESSURE: 114 MMHG | HEART RATE: 82 BPM

## 2021-04-15 DIAGNOSIS — O09.93 ENCOUNTER FOR SUPERVISION OF HIGH RISK PREGNANCY IN THIRD TRIMESTER, ANTEPARTUM: ICD-10-CM

## 2021-04-15 DIAGNOSIS — Z3A.35 35 WEEKS GESTATION OF PREGNANCY: Primary | ICD-10-CM

## 2021-04-15 DIAGNOSIS — Z86.79 HISTORY OF PULMONARY HYPERTENSION: ICD-10-CM

## 2021-04-15 DIAGNOSIS — E07.9 THYROID DISORDER: ICD-10-CM

## 2021-04-15 DIAGNOSIS — O09.93 HIGH-RISK PREGNANCY IN THIRD TRIMESTER: ICD-10-CM

## 2021-04-15 DIAGNOSIS — Z3A.32 32 WEEKS GESTATION OF PREGNANCY: ICD-10-CM

## 2021-04-15 PROBLEM — Z3A.30 30 WEEKS GESTATION OF PREGNANCY: Status: RESOLVED | Noted: 2021-03-17 | Resolved: 2021-04-15

## 2021-04-15 LAB
ACCELERATIONS > 10BPM: NORMAL
ACCELERATIONS > 15 BPM: 7
ACOUSTIC STIMULATION: NO
DECELERATIONS: NORMAL
FHR VARIABILITIES: NORMAL
NST ASSESSMENT: REACTIVE
NST BASELINE: 135
NST DURATION: 20 MINUTES
NST INDICATIONS: NORMAL
NST LOCATIONS: NORMAL
NST READ BY: NORMAL
NST RETURN: NORMAL
UTERINE ACTIVITY: NO

## 2021-04-15 PROCEDURE — 59025 FETAL NON-STRESS TEST: CPT | Performed by: SPECIALIST

## 2021-04-15 PROCEDURE — 0502F SUBSEQUENT PRENATAL CARE: CPT | Performed by: SPECIALIST

## 2021-04-15 NOTE — PROGRESS NOTES
Abner Danielson is a 31 y.o. female 35w0d    [id number]    OB History    Para Term  AB Living   7 4 4   2 4   SAB TAB Ectopic Molar Multiple Live Births   2         4      # Outcome Date GA Lbr Sylvester/2nd Weight Sex Delivery Anes PTL Lv   7 Current            6 SAB            5 Term 14 39w0d  9 lb 13 oz (4.451 kg) M Vag-Spont EPI N SHENG   4 Term 13 38w0d  7 lb 6 oz (3.345 kg) F Vag-Spont EPI N SHENG      Complications: Pulmonary hypertension (HCC)   3 Term 01/05/10 39w0d  6 lb 14 oz (3.118 kg) F Vag-Spont EPI N SHENG   2 SAB            1 Term 10/18/07 39w0d  8 lb 7 oz (3.827 kg) F Vag-Spont EPI N SHENG       Chief Complaint   Patient presents with    High Risk Pregnancy        Blood pressure 114/73, pulse 82, weight 205 lb (93 kg), not currently breastfeeding. The patient was seen and evaluated. There was positive fetal movements. No contractions or leakage of fluid. Signs and symptoms of labor were reviewed. The S/S of Pre-Eclampsia were reviewed with the patient in detail. She is to report any of these if they occur. She currently denies any of these. The patient was instructed on fetal kick counts and was given a kick sheet to complete every 8 hours. She was instructed that the baby should move at a minimum of ten times within one hour after a meal. The patient was instructed to lay down on her left side twenty minutes after eating and count movements for up to one hour with a target value of ten movements. She was instructed to notify the office if she did not make that target after two attempts or if after any attempt there was less than four movements. The patient reports that the targets have been made Yes. Allergies: Allergies as of 04/15/2021 - Review Complete 04/15/2021   Allergen Reaction Noted    Topamax [topiramate] Other (See Comments) 2014    Zoloft [sertraline hcl] Other (See Comments) 2014       Group Beta Strep collection was completed.  No: not due yet    GC and Chlamydia were previously preformed and reviewed. The results are negative. She has been instructed to call the office at anytime prior to going into the hospital so the on-call physician may direct her to the appropriate facility for care. Exceptions were reviewed including but not limited to: Decreased fetal movement, vaginal Bleeding or hemorrhage, trauma, readily expectant delivery, or any instance where she feels 911 should be utilized. Patient Active Problem List    Diagnosis Date Noted    Diet controlled gestational diabetes mellitus (GDM) in third trimester 03/25/2021    ADHD 01/04/2021     Overview Note:     The patient was on Concerta      Fatty liver 06/06/2018     Overview Note:     She had elevated LFTs that have normalized. She followed with GI. Work up for Intrinsic hepatic disease was negative. Severe hepatic steatosis seen on Ct on 5/2018      Cyst of right ovary 06/06/2018    History of Thyroid Disorder (G3) 09/03/2014     Overview Note:     Per patient she was diagnosed with hyperthyroidism in third pregnancy and was on medications. No issues outside of pregnancy. Not currently on meds.  History of pulmonary hypertension (G3) 09/03/2014     Overview Note:     Echo 12/7/20: EF 55%, RV pressure 32 mmg indicated mild pulm HTN     History of Pulmonary HTN in prior pregnancy- normal ECHO in 2014 per patient      Pelvic pain  07/15/2014     Overview Note:     Musculoskeletal. Pubic Symphysis strain  Flexeril and pregnancy support belt given 07/15/2014        High-risk pregnancy in third trimester 07/15/2014     Overview Note:     Rh+/RI/GBS neg  Quad screen: WNL  Male      Hx of postpartum depression, currently pregnant 07/15/2014        Diagnosis Orders   1. 35 weeks gestation of pregnancy     2. High-risk pregnancy in third trimester     3. History of Thyroid Disorder (G3)         Patient doing well.   NST done today is reactive (see procedures tab for detail result). Fetal heart rate per NST baseline is 135 bpm and fundal height is 35 cm. today. Patient advised to continue taking prenatal vitamins and to rest as necessary. The patient will return to the office for her next visit in 3 days. See antepartum flow sheet. Jignesh Lopez am scribing for, and in the presence of Dr. Topher Taylor. Electronically signed by: Boyd Salazar 4/15/21 4:31 PM   I agree to the above documentation placed by my scribe Boyd Salazar. I reviewed the scribe's note and agree with the documented findings and plan of care. Any areas of disagreement are noted on the chart. I have personally evaluated this patient. Additional findings are as noted. I agree with the chief complaint, past medical history, past surgical history, allergies, medications, social and family history as documented unless otherwise noted below.      Electronically signed by Topher Taylor MD on 4/16/2021 at 3:46 AM

## 2021-04-19 ENCOUNTER — ROUTINE PRENATAL (OUTPATIENT)
Dept: PERINATAL CARE | Age: 31
End: 2021-04-19
Payer: COMMERCIAL

## 2021-04-19 VITALS
RESPIRATION RATE: 16 BRPM | TEMPERATURE: 97.2 F | HEIGHT: 61 IN | DIASTOLIC BLOOD PRESSURE: 66 MMHG | SYSTOLIC BLOOD PRESSURE: 109 MMHG | HEART RATE: 92 BPM | BODY MASS INDEX: 39.08 KG/M2 | WEIGHT: 207 LBS

## 2021-04-19 DIAGNOSIS — Z3A.35 35 WEEKS GESTATION OF PREGNANCY: ICD-10-CM

## 2021-04-19 DIAGNOSIS — O99.213 OBESITY AFFECTING PREGNANCY IN THIRD TRIMESTER: ICD-10-CM

## 2021-04-19 DIAGNOSIS — O35.2XX0 HEREDITARY FAMILIAL DISEASE AFFECTING MANAGEMENT OF MOTHER AND POSSIBLY AFFECTING FETUS, ANTEPARTUM, SINGLE OR UNSPECIFIED FETUS: ICD-10-CM

## 2021-04-19 DIAGNOSIS — O24.410 DIET CONTROLLED GESTATIONAL DIABETES MELLITUS (GDM), ANTEPARTUM: Primary | ICD-10-CM

## 2021-04-19 DIAGNOSIS — O99.283 HYPERTHYROIDISM AFFECTING PREGNANCY IN THIRD TRIMESTER: ICD-10-CM

## 2021-04-19 DIAGNOSIS — O99.413 MATERNAL CARDIOVASCULAR DISEASE AFFECTING PREGNANCY IN THIRD TRIMESTER: ICD-10-CM

## 2021-04-19 DIAGNOSIS — Z36.4 ANTENATAL SCREENING FOR FETAL GROWTH RETARDATION USING ULTRASONICS: ICD-10-CM

## 2021-04-19 DIAGNOSIS — E05.90 HYPERTHYROIDISM AFFECTING PREGNANCY IN THIRD TRIMESTER: ICD-10-CM

## 2021-04-19 LAB
ABDOMINAL CIRCUMFERENCE: NORMAL
BIPARIETAL DIAMETER: NORMAL
ESTIMATED FETAL WEIGHT: NORMAL
FEMORAL DIAMETER: NORMAL
HC/AC: NORMAL
HEAD CIRCUMFERENCE: NORMAL

## 2021-04-19 PROCEDURE — 76819 FETAL BIOPHYS PROFIL W/O NST: CPT | Performed by: OBSTETRICS & GYNECOLOGY

## 2021-04-19 PROCEDURE — 76816 OB US FOLLOW-UP PER FETUS: CPT | Performed by: OBSTETRICS & GYNECOLOGY

## 2021-04-22 ENCOUNTER — ROUTINE PRENATAL (OUTPATIENT)
Dept: OBGYN CLINIC | Age: 31
End: 2021-04-22
Payer: COMMERCIAL

## 2021-04-22 ENCOUNTER — HOSPITAL ENCOUNTER (OUTPATIENT)
Age: 31
Setting detail: SPECIMEN
Discharge: HOME | End: 2021-04-22
Payer: COMMERCIAL

## 2021-04-22 VITALS
TEMPERATURE: 98.4 F | BODY MASS INDEX: 39.49 KG/M2 | SYSTOLIC BLOOD PRESSURE: 106 MMHG | WEIGHT: 209 LBS | HEART RATE: 89 BPM | DIASTOLIC BLOOD PRESSURE: 69 MMHG

## 2021-04-22 DIAGNOSIS — Z3A.36 36 WEEKS GESTATION OF PREGNANCY: ICD-10-CM

## 2021-04-22 DIAGNOSIS — O24.410 DIET CONTROLLED GESTATIONAL DIABETES MELLITUS (GDM) IN THIRD TRIMESTER: ICD-10-CM

## 2021-04-22 DIAGNOSIS — Z86.79 HISTORY OF PULMONARY HYPERTENSION: ICD-10-CM

## 2021-04-22 DIAGNOSIS — O09.93 ENCOUNTER FOR SUPERVISION OF HIGH RISK PREGNANCY IN THIRD TRIMESTER, ANTEPARTUM: ICD-10-CM

## 2021-04-22 DIAGNOSIS — Z3A.32 32 WEEKS GESTATION OF PREGNANCY: ICD-10-CM

## 2021-04-22 DIAGNOSIS — E07.9 THYROID DISORDER: ICD-10-CM

## 2021-04-22 DIAGNOSIS — O09.93 HRP (HIGH RISK PREGNANCY), THIRD TRIMESTER: Primary | ICD-10-CM

## 2021-04-22 LAB
ACCELERATIONS > 10BPM: NORMAL
ACCELERATIONS > 15 BPM: 3
ACOUSTIC STIMULATION: NO
DECELERATIONS: NORMAL
FHR VARIABILITIES: NORMAL
NST ASSESSMENT: REACTIVE
NST BASELINE: 130
NST DURATION: 20 MINUTES
NST INDICATIONS: NORMAL
NST LOCATIONS: NORMAL
NST READ BY: NORMAL
NST RETURN: NORMAL
UTERINE ACTIVITY: NO

## 2021-04-22 PROCEDURE — 0502F SUBSEQUENT PRENATAL CARE: CPT | Performed by: SPECIALIST

## 2021-04-22 PROCEDURE — 59025 FETAL NON-STRESS TEST: CPT | Performed by: SPECIALIST

## 2021-04-22 RX ORDER — FAMOTIDINE 20 MG/1
20 TABLET, FILM COATED ORAL 2 TIMES DAILY
Qty: 60 TABLET | Refills: 3 | Status: SHIPPED | OUTPATIENT
Start: 2021-04-22 | End: 2021-05-27

## 2021-04-22 NOTE — PROGRESS NOTES
Jazzy Diaz is a 31 y.o. female 36w0d    [id number]    OB History    Para Term  AB Living   7 4 4   2 4   SAB TAB Ectopic Molar Multiple Live Births   2         4      # Outcome Date GA Lbr Sylvester/2nd Weight Sex Delivery Anes PTL Lv   7 Current            6 SAB            5 Term 14 39w0d  9 lb 13 oz (4.451 kg) M Vag-Spont EPI N SHENG   4 Term 13 38w0d  7 lb 6 oz (3.345 kg) F Vag-Spont EPI N SHENG      Complications: Pulmonary hypertension (HCC)   3 Term 01/05/10 39w0d  6 lb 14 oz (3.118 kg) F Vag-Spont EPI N SHENG   2 SAB            1 Term 10/18/07 39w0d  8 lb 7 oz (3.827 kg) F Vag-Spont EPI N SHENG       Chief Complaint   Patient presents with    Routine Prenatal Visit     Patient is 36 weeks gestation and is here for supervision of high risk pregnancy.  High Risk Pregnancy     pulmonary HTN    Gestational Diabetes    Non-stress Test        Blood pressure 106/69, pulse 89, temperature 98.4 °F (36.9 °C), temperature source Temporal, weight 209 lb (94.8 kg), not currently breastfeeding. The patient was seen and evaluated. There was positive fetal movements. No contractions or leakage of fluid. Signs and symptoms of labor were reviewed. The S/S of Pre-Eclampsia were reviewed with the patient in detail. She is to report any of these if they occur. She currently denies any of these. The patient was instructed on fetal kick counts and was given a kick sheet to complete every 8 hours. She was instructed that the baby should move at a minimum of ten times within one hour after a meal. The patient was instructed to lay down on her left side twenty minutes after eating and count movements for up to one hour with a target value of ten movements. She was instructed to notify the office if she did not make that target after two attempts or if after any attempt there was less than four movements. The patient reports that the targets have been made Yes. Allergies:   Allergies as of 04/22/2021 - Review Complete 04/22/2021   Allergen Reaction Noted    Topamax [topiramate] Other (See Comments) 04/25/2014    Zoloft [sertraline hcl] Other (See Comments) 04/25/2014       Group Beta Strep collection was completed. Yes    GC and Chlamydia were previously preformed and reviewed. The results are negative. She has been instructed to call the office at anytime prior to going into the hospital so the on-call physician may direct her to the appropriate facility for care. Exceptions were reviewed including but not limited to: Decreased fetal movement, vaginal Bleeding or hemorrhage, trauma, readily expectant delivery, or any instance where she feels 911 should be utilized. Patient Active Problem List    Diagnosis Date Noted    Diet controlled gestational diabetes mellitus (GDM) in third trimester 03/25/2021    ADHD 01/04/2021     Overview Note:     The patient was on Concerta      Fatty liver 06/06/2018     Overview Note:     She had elevated LFTs that have normalized. She followed with GI. Work up for Intrinsic hepatic disease was negative. Severe hepatic steatosis seen on Ct on 5/2018      Cyst of right ovary 06/06/2018    History of Thyroid Disorder (G3) 09/03/2014     Overview Note:     Per patient she was diagnosed with hyperthyroidism in third pregnancy and was on medications. No issues outside of pregnancy. Not currently on meds.          History of pulmonary hypertension (G3) 09/03/2014     Overview Note:     Echo 12/7/20: EF 55%, RV pressure 32 mmg indicated mild pulm HTN     History of Pulmonary HTN in prior pregnancy- normal ECHO in 2014 per patient      Pelvic pain  07/15/2014     Overview Note:     Musculoskeletal. Pubic Symphysis strain  Flexeril and pregnancy support belt given 07/15/2014        High-risk pregnancy in third trimester 07/15/2014     Overview Note:     Rh+/RI/GBS neg  Quad screen: WNL  Male      Hx of postpartum depression, currently pregnant 07/15/2014

## 2021-04-23 LAB
DIRECT EXAM: ABNORMAL
Lab: ABNORMAL
SPECIMEN DESCRIPTION: ABNORMAL

## 2021-04-29 ENCOUNTER — ROUTINE PRENATAL (OUTPATIENT)
Dept: OBGYN CLINIC | Age: 31
End: 2021-04-29
Payer: COMMERCIAL

## 2021-04-29 VITALS
BODY MASS INDEX: 39.79 KG/M2 | TEMPERATURE: 97.6 F | HEART RATE: 83 BPM | WEIGHT: 210.6 LBS | SYSTOLIC BLOOD PRESSURE: 102 MMHG | DIASTOLIC BLOOD PRESSURE: 69 MMHG

## 2021-04-29 DIAGNOSIS — Z86.79 HISTORY OF PULMONARY HYPERTENSION: ICD-10-CM

## 2021-04-29 DIAGNOSIS — O24.410 DIET CONTROLLED GESTATIONAL DIABETES MELLITUS (GDM) IN THIRD TRIMESTER: ICD-10-CM

## 2021-04-29 DIAGNOSIS — O09.93 HIGH-RISK PREGNANCY IN THIRD TRIMESTER: ICD-10-CM

## 2021-04-29 DIAGNOSIS — Z3A.37 37 WEEKS GESTATION OF PREGNANCY: Primary | ICD-10-CM

## 2021-04-29 DIAGNOSIS — Z3A.32 32 WEEKS GESTATION OF PREGNANCY: ICD-10-CM

## 2021-04-29 DIAGNOSIS — E07.9 THYROID DISORDER: ICD-10-CM

## 2021-04-29 DIAGNOSIS — O09.93 ENCOUNTER FOR SUPERVISION OF HIGH RISK PREGNANCY IN THIRD TRIMESTER, ANTEPARTUM: ICD-10-CM

## 2021-04-29 LAB
ACCELERATIONS > 10BPM: NORMAL
ACCELERATIONS > 15 BPM: 5
ACOUSTIC STIMULATION: NO
DECELERATIONS: NORMAL
FHR VARIABILITIES: NORMAL
NST ASSESSMENT: REACTIVE
NST BASELINE: 140
NST DURATION: 20 MINUTES
NST INDICATIONS: NORMAL
NST LOCATIONS: NORMAL
NST READ BY: NORMAL
NST RETURN: NORMAL
UTERINE ACTIVITY: NO

## 2021-04-29 PROCEDURE — 0502F SUBSEQUENT PRENATAL CARE: CPT | Performed by: SPECIALIST

## 2021-04-29 PROCEDURE — 59025 FETAL NON-STRESS TEST: CPT | Performed by: SPECIALIST

## 2021-04-29 NOTE — PROGRESS NOTES
collection was completed. Yes    GC and Chlamydia were previously preformed and reviewed. The results are negative. She has been instructed to call the office at anytime prior to going into the hospital so the on-call physician may direct her to the appropriate facility for care. Exceptions were reviewed including but not limited to: Decreased fetal movement, vaginal Bleeding or hemorrhage, trauma, readily expectant delivery, or any instance where she feels 911 should be utilized. Patient Active Problem List    Diagnosis Date Noted    37 weeks gestation of pregnancy 04/29/2021    Diet controlled gestational diabetes mellitus (GDM) in third trimester 03/25/2021    ADHD 01/04/2021     Overview Note:     The patient was on Concerta      Fatty liver 06/06/2018     Overview Note:     She had elevated LFTs that have normalized. She followed with GI. Work up for Intrinsic hepatic disease was negative. Severe hepatic steatosis seen on Ct on 5/2018      Cyst of right ovary 06/06/2018    History of Thyroid Disorder (G3) 09/03/2014     Overview Note:     Per patient she was diagnosed with hyperthyroidism in third pregnancy and was on medications. No issues outside of pregnancy. Not currently on meds.  History of pulmonary hypertension (G3) 09/03/2014     Overview Note:     Echo 12/7/20: EF 55%, RV pressure 32 mmg indicated mild pulm HTN     History of Pulmonary HTN in prior pregnancy- normal ECHO in 2014 per patient      Pelvic pain  07/15/2014     Overview Note:     Musculoskeletal. Pubic Symphysis strain  Flexeril and pregnancy support belt given 07/15/2014        High-risk pregnancy in third trimester 07/15/2014     Overview Note:     Rh+/RI/GBS neg  Quad screen: WNL  Male      Hx of postpartum depression, currently pregnant 07/15/2014        Diagnosis Orders   1. 37 weeks gestation of pregnancy     2. Diet controlled gestational diabetes mellitus (GDM) in third trimester     3.  High-risk

## 2021-05-05 ENCOUNTER — TELEPHONE (OUTPATIENT)
Dept: OBGYN CLINIC | Age: 31
End: 2021-05-05

## 2021-05-06 ENCOUNTER — ROUTINE PRENATAL (OUTPATIENT)
Dept: OBGYN CLINIC | Age: 31
End: 2021-05-06
Payer: COMMERCIAL

## 2021-05-06 ENCOUNTER — HOSPITAL ENCOUNTER (OUTPATIENT)
Dept: LAB | Age: 31
Setting detail: SPECIMEN
Discharge: HOME | End: 2021-05-06
Payer: COMMERCIAL

## 2021-05-06 VITALS
TEMPERATURE: 97.5 F | BODY MASS INDEX: 39.68 KG/M2 | WEIGHT: 210 LBS | SYSTOLIC BLOOD PRESSURE: 119 MMHG | HEART RATE: 88 BPM | DIASTOLIC BLOOD PRESSURE: 68 MMHG

## 2021-05-06 DIAGNOSIS — Z86.79 HISTORY OF PULMONARY HYPERTENSION: ICD-10-CM

## 2021-05-06 DIAGNOSIS — O09.93 HIGH-RISK PREGNANCY IN THIRD TRIMESTER: ICD-10-CM

## 2021-05-06 DIAGNOSIS — O09.93 ENCOUNTER FOR SUPERVISION OF HIGH RISK PREGNANCY IN THIRD TRIMESTER, ANTEPARTUM: ICD-10-CM

## 2021-05-06 DIAGNOSIS — E07.9 THYROID DISORDER: ICD-10-CM

## 2021-05-06 DIAGNOSIS — Z3A.38 38 WEEKS GESTATION OF PREGNANCY: Primary | ICD-10-CM

## 2021-05-06 DIAGNOSIS — O24.410 DIET CONTROLLED GESTATIONAL DIABETES MELLITUS (GDM) IN THIRD TRIMESTER: ICD-10-CM

## 2021-05-06 DIAGNOSIS — Z3A.32 32 WEEKS GESTATION OF PREGNANCY: ICD-10-CM

## 2021-05-06 DIAGNOSIS — Z01.818 PRE-OP TESTING: Primary | ICD-10-CM

## 2021-05-06 LAB
ACCELERATIONS > 10BPM: NORMAL
ACCELERATIONS > 15 BPM: 7
ACOUSTIC STIMULATION: NO
DECELERATIONS: NORMAL
FHR VARIABILITIES: NORMAL
NST ASSESSMENT: REACTIVE
NST BASELINE: 145
NST DURATION: 20 MINUTES
NST INDICATIONS: NORMAL
NST LOCATIONS: NORMAL
NST READ BY: NORMAL
NST RETURN: NORMAL
UTERINE ACTIVITY: YES

## 2021-05-06 PROCEDURE — 59025 FETAL NON-STRESS TEST: CPT | Performed by: SPECIALIST

## 2021-05-06 PROCEDURE — 0502F SUBSEQUENT PRENATAL CARE: CPT | Performed by: SPECIALIST

## 2021-05-06 PROCEDURE — U0005 INFEC AGEN DETEC AMPLI PROBE: HCPCS

## 2021-05-06 PROCEDURE — U0003 INFECTIOUS AGENT DETECTION BY NUCLEIC ACID (DNA OR RNA); SEVERE ACUTE RESPIRATORY SYNDROME CORONAVIRUS 2 (SARS-COV-2) (CORONAVIRUS DISEASE [COVID-19]), AMPLIFIED PROBE TECHNIQUE, MAKING USE OF HIGH THROUGHPUT TECHNOLOGIES AS DESCRIBED BY CMS-2020-01-R: HCPCS

## 2021-05-06 NOTE — PROGRESS NOTES
Breezy Brito is a 31 y.o. female 38w0d    [id number]    OB History    Para Term  AB Living   7 4 4   2 4   SAB TAB Ectopic Molar Multiple Live Births   2         4      # Outcome Date GA Lbr Sylvester/2nd Weight Sex Delivery Anes PTL Lv   7 Current            6 SAB            5 Term 14 39w0d  9 lb 13 oz (4.451 kg) M Vag-Spont EPI N SHENG   4 Term 13 38w0d  7 lb 6 oz (3.345 kg) F Vag-Spont EPI N SHENG      Complications: Pulmonary hypertension (HCC)   3 Term 01/05/10 39w0d  6 lb 14 oz (3.118 kg) F Vag-Spont EPI N SHENG   2 2009           1 Term 10/18/07 39w0d  8 lb 7 oz (3.827 kg) F Vag-Spont EPI N SHENG       Chief Complaint   Patient presents with    Routine Prenatal Visit     Patient is 38 weeks gestation and is here for supervision of high risk pregnancy.  High Risk Pregnancy    Gestational Diabetes    Non-stress Test        Blood pressure 119/68, pulse 88, temperature 97.5 °F (36.4 °C), temperature source Temporal, weight 210 lb (95.3 kg), not currently breastfeeding. The patient was seen and evaluated. There was positive fetal movements. No contractions or leakage of fluid. Signs and symptoms of labor were reviewed. The S/S of Pre-Eclampsia were reviewed with the patient in detail. She is to report any of these if they occur. She currently denies any of these. The patient was instructed on fetal kick counts and was given a kick sheet to complete every 8 hours. She was instructed that the baby should move at a minimum of ten times within one hour after a meal. The patient was instructed to lay down on her left side twenty minutes after eating and count movements for up to one hour with a target value of ten movements. She was instructed to notify the office if she did not make that target after two attempts or if after any attempt there was less than four movements. The patient reports that the targets have been made Yes. Allergies:   Allergies as of 2021 - Review WNL  Male      Hx of postpartum depression, currently pregnant 07/15/2014        Diagnosis Orders   1. 38 weeks gestation of pregnancy     2. Diet controlled gestational diabetes mellitus (GDM) in third trimester     3. High-risk pregnancy in third trimester     4. History of pulmonary hypertension (G3)     5. History of Thyroid Disorder (G3)         Patient doing well. NST done today is reactive (see procedures tab for detail result). Fetal heart rate per NST baseline is 145 bpm and fundal height is 37 cm. today. Patient advised to continue taking prenatal vitamins and to rest as necessary. The patient will return to the office for her next visit in 3 days. See antepartum flow sheet. Tiffany Burnett am scribing for, and in the presence of Dr. Sanjuan Aase. Electronically signed by: Luh Veras 5/6/21 4:10 PM   I agree to the above documentation placed by my scribe Luh Veras. I reviewed the scribe's note and agree with the documented findings and plan of care. Any areas of disagreement are noted on the chart. I have personally evaluated this patient. Additional findings are as noted. I agree with the chief complaint, past medical history, past surgical history, allergies, medications, social and family history as documented unless otherwise noted below.      Electronically signed by Sanjuan Aase, MD on 5/7/2021 at 4:46 AM

## 2021-05-08 LAB
SARS-COV-2: NORMAL
SARS-COV-2: NOT DETECTED
SOURCE: NORMAL

## 2021-05-13 ENCOUNTER — HOSPITAL ENCOUNTER (INPATIENT)
Age: 31
LOS: 3 days | Discharge: HOME | End: 2021-05-16
Attending: SPECIALIST | Admitting: SPECIALIST
Payer: COMMERCIAL

## 2021-05-13 ENCOUNTER — APPOINTMENT (OUTPATIENT)
Dept: LABOR AND DELIVERY | Age: 31
End: 2021-05-13
Payer: COMMERCIAL

## 2021-05-13 ENCOUNTER — ANESTHESIA EVENT (OUTPATIENT)
Dept: LABOR AND DELIVERY | Age: 31
End: 2021-05-13
Payer: COMMERCIAL

## 2021-05-13 ENCOUNTER — ANESTHESIA (OUTPATIENT)
Dept: LABOR AND DELIVERY | Age: 31
End: 2021-05-13
Payer: COMMERCIAL

## 2021-05-13 PROBLEM — Z3A.38 38 WEEKS GESTATION OF PREGNANCY: Status: RESOLVED | Noted: 2021-05-06 | Resolved: 2021-05-13

## 2021-05-13 PROBLEM — O09.93 HIGH-RISK PREGNANCY IN THIRD TRIMESTER: Status: ACTIVE | Noted: 2021-05-13

## 2021-05-13 PROBLEM — Z3A.37 37 WEEKS GESTATION OF PREGNANCY: Status: RESOLVED | Noted: 2021-04-29 | Resolved: 2021-05-13

## 2021-05-13 LAB
ABO/RH: NORMAL
ABSOLUTE EOS #: 0.1 K/UL (ref 0–0.4)
ABSOLUTE IMMATURE GRANULOCYTE: ABNORMAL K/UL (ref 0–0.3)
ABSOLUTE LYMPH #: 1.3 K/UL (ref 1–4.8)
ABSOLUTE MONO #: 0.9 K/UL (ref 0.1–1.3)
ALBUMIN SERPL-MCNC: 3 G/DL (ref 3.5–5.2)
ALBUMIN/GLOBULIN RATIO: ABNORMAL (ref 1–2.5)
ALP BLD-CCNC: 160 U/L (ref 35–104)
ALT SERPL-CCNC: 11 U/L (ref 5–33)
ANION GAP SERPL CALCULATED.3IONS-SCNC: 9 MMOL/L (ref 9–17)
ANTIBODY SCREEN: NEGATIVE
ARM BAND NUMBER: NORMAL
AST SERPL-CCNC: 14 U/L
BASOPHILS # BLD: 0 % (ref 0–2)
BASOPHILS ABSOLUTE: 0 K/UL (ref 0–0.2)
BILIRUB SERPL-MCNC: 0.2 MG/DL (ref 0.3–1.2)
BUN BLDV-MCNC: 9 MG/DL (ref 6–20)
BUN/CREAT BLD: ABNORMAL (ref 9–20)
CALCIUM SERPL-MCNC: 8.8 MG/DL (ref 8.6–10.4)
CHLORIDE BLD-SCNC: 106 MMOL/L (ref 98–107)
CO2: 21 MMOL/L (ref 20–31)
CREAT SERPL-MCNC: 0.63 MG/DL (ref 0.5–0.9)
DIFFERENTIAL TYPE: ABNORMAL
EOSINOPHILS RELATIVE PERCENT: 1 % (ref 0–4)
EXPIRATION DATE: NORMAL
GFR AFRICAN AMERICAN: >60 ML/MIN
GFR NON-AFRICAN AMERICAN: >60 ML/MIN
GFR SERPL CREATININE-BSD FRML MDRD: ABNORMAL ML/MIN/{1.73_M2}
GFR SERPL CREATININE-BSD FRML MDRD: ABNORMAL ML/MIN/{1.73_M2}
GLUCOSE BLD-MCNC: 71 MG/DL (ref 65–105)
GLUCOSE BLD-MCNC: 73 MG/DL (ref 65–105)
GLUCOSE BLD-MCNC: 74 MG/DL (ref 70–99)
GLUCOSE BLD-MCNC: 98 MG/DL (ref 65–105)
GLUCOSE BLD-MCNC: 99 MG/DL (ref 65–105)
HCT VFR BLD CALC: 34.4 % (ref 36–46)
HEMOGLOBIN: 11.7 G/DL (ref 12–16)
IMMATURE GRANULOCYTES: ABNORMAL %
LYMPHOCYTES # BLD: 13 % (ref 24–44)
MCH RBC QN AUTO: 30.5 PG (ref 26–34)
MCHC RBC AUTO-ENTMCNC: 34 G/DL (ref 31–37)
MCV RBC AUTO: 89.8 FL (ref 80–100)
MONOCYTES # BLD: 9 % (ref 1–7)
NRBC AUTOMATED: ABNORMAL PER 100 WBC
PDW BLD-RTO: 13.4 % (ref 11.5–14.9)
PLATELET # BLD: 253 K/UL (ref 150–450)
PLATELET ESTIMATE: ABNORMAL
PMV BLD AUTO: 9.6 FL (ref 6–12)
POTASSIUM SERPL-SCNC: 3.9 MMOL/L (ref 3.7–5.3)
RBC # BLD: 3.83 M/UL (ref 4–5.2)
RBC # BLD: ABNORMAL 10*6/UL
SEG NEUTROPHILS: 77 % (ref 36–66)
SEGMENTED NEUTROPHILS ABSOLUTE COUNT: 7.9 K/UL (ref 1.3–9.1)
SODIUM BLD-SCNC: 136 MMOL/L (ref 135–144)
T. PALLIDUM, IGG: NONREACTIVE
TOTAL PROTEIN: 6 G/DL (ref 6.4–8.3)
WBC # BLD: 10.3 K/UL (ref 3.5–11)
WBC # BLD: ABNORMAL 10*3/UL

## 2021-05-13 PROCEDURE — 2500000003 HC RX 250 WO HCPCS: Performed by: ANESTHESIOLOGY

## 2021-05-13 PROCEDURE — 1220000000 HC SEMI PRIVATE OB R&B

## 2021-05-13 PROCEDURE — 86780 TREPONEMA PALLIDUM: CPT

## 2021-05-13 PROCEDURE — 86900 BLOOD TYPING SEROLOGIC ABO: CPT

## 2021-05-13 PROCEDURE — 3700000025 EPIDURAL BLOCK: Performed by: ANESTHESIOLOGY

## 2021-05-13 PROCEDURE — 82947 ASSAY GLUCOSE BLOOD QUANT: CPT

## 2021-05-13 PROCEDURE — 86850 RBC ANTIBODY SCREEN: CPT

## 2021-05-13 PROCEDURE — 6370000000 HC RX 637 (ALT 250 FOR IP): Performed by: SPECIALIST

## 2021-05-13 PROCEDURE — 36415 COLL VENOUS BLD VENIPUNCTURE: CPT

## 2021-05-13 PROCEDURE — 80053 COMPREHEN METABOLIC PANEL: CPT

## 2021-05-13 PROCEDURE — 6360000002 HC RX W HCPCS: Performed by: STUDENT IN AN ORGANIZED HEALTH CARE EDUCATION/TRAINING PROGRAM

## 2021-05-13 PROCEDURE — 6360000002 HC RX W HCPCS: Performed by: SPECIALIST

## 2021-05-13 PROCEDURE — 86901 BLOOD TYPING SEROLOGIC RH(D): CPT

## 2021-05-13 PROCEDURE — 85025 COMPLETE CBC W/AUTO DIFF WBC: CPT

## 2021-05-13 PROCEDURE — 2580000003 HC RX 258: Performed by: SPECIALIST

## 2021-05-13 PROCEDURE — 76815 OB US LIMITED FETUS(S): CPT

## 2021-05-13 RX ORDER — SODIUM CHLORIDE, SODIUM LACTATE, POTASSIUM CHLORIDE, CALCIUM CHLORIDE 600; 310; 30; 20 MG/100ML; MG/100ML; MG/100ML; MG/100ML
INJECTION, SOLUTION INTRAVENOUS CONTINUOUS
Status: DISCONTINUED | OUTPATIENT
Start: 2021-05-13 | End: 2021-05-13

## 2021-05-13 RX ORDER — SODIUM CHLORIDE 9 MG/ML
25 INJECTION, SOLUTION INTRAVENOUS CONTINUOUS
Status: DISCONTINUED | OUTPATIENT
Start: 2021-05-13 | End: 2021-05-13

## 2021-05-13 RX ORDER — BUPIVACAINE HYDROCHLORIDE 2.5 MG/ML
INJECTION, SOLUTION EPIDURAL; INFILTRATION; INTRACAUDAL PRN
Status: DISCONTINUED | OUTPATIENT
Start: 2021-05-13 | End: 2021-05-14 | Stop reason: SDUPTHER

## 2021-05-13 RX ORDER — DOCUSATE SODIUM 100 MG/1
100 CAPSULE, LIQUID FILLED ORAL 2 TIMES DAILY
Status: DISCONTINUED | OUTPATIENT
Start: 2021-05-13 | End: 2021-05-13

## 2021-05-13 RX ORDER — ONDANSETRON 2 MG/ML
4 INJECTION INTRAMUSCULAR; INTRAVENOUS EVERY 6 HOURS PRN
Status: DISCONTINUED | OUTPATIENT
Start: 2021-05-13 | End: 2021-05-14

## 2021-05-13 RX ORDER — SODIUM CHLORIDE 9 MG/ML
25 INJECTION, SOLUTION INTRAVENOUS PRN
Status: DISCONTINUED | OUTPATIENT
Start: 2021-05-13 | End: 2021-05-13

## 2021-05-13 RX ORDER — NALOXONE HYDROCHLORIDE 0.4 MG/ML
0.4 INJECTION, SOLUTION INTRAMUSCULAR; INTRAVENOUS; SUBCUTANEOUS PRN
Status: DISCONTINUED | OUTPATIENT
Start: 2021-05-13 | End: 2021-05-14

## 2021-05-13 RX ORDER — ACETAMINOPHEN 325 MG/1
650 TABLET ORAL EVERY 4 HOURS PRN
Status: DISCONTINUED | OUTPATIENT
Start: 2021-05-13 | End: 2021-05-14

## 2021-05-13 RX ORDER — SODIUM CHLORIDE 0.9 % (FLUSH) 0.9 %
5-40 SYRINGE (ML) INJECTION EVERY 12 HOURS SCHEDULED
Status: DISCONTINUED | OUTPATIENT
Start: 2021-05-13 | End: 2021-05-14

## 2021-05-13 RX ORDER — NALBUPHINE HCL 10 MG/ML
5 AMPUL (ML) INJECTION EVERY 4 HOURS PRN
Status: DISCONTINUED | OUTPATIENT
Start: 2021-05-13 | End: 2021-05-14

## 2021-05-13 RX ORDER — SODIUM CHLORIDE 0.9 % (FLUSH) 0.9 %
10 SYRINGE (ML) INJECTION EVERY 12 HOURS SCHEDULED
Status: DISCONTINUED | OUTPATIENT
Start: 2021-05-13 | End: 2021-05-14

## 2021-05-13 RX ORDER — SODIUM CHLORIDE 9 MG/ML
INJECTION, SOLUTION INTRAVENOUS CONTINUOUS
Status: DISCONTINUED | OUTPATIENT
Start: 2021-05-13 | End: 2021-05-14

## 2021-05-13 RX ORDER — SODIUM CHLORIDE 0.9 % (FLUSH) 0.9 %
10 SYRINGE (ML) INJECTION PRN
Status: DISCONTINUED | OUTPATIENT
Start: 2021-05-13 | End: 2021-05-14

## 2021-05-13 RX ORDER — DOCUSATE SODIUM 100 MG/1
100 CAPSULE, LIQUID FILLED ORAL 2 TIMES DAILY
Status: DISCONTINUED | OUTPATIENT
Start: 2021-05-13 | End: 2021-05-14

## 2021-05-13 RX ORDER — SODIUM CHLORIDE 0.9 % (FLUSH) 0.9 %
5-40 SYRINGE (ML) INJECTION PRN
Status: DISCONTINUED | OUTPATIENT
Start: 2021-05-13 | End: 2021-05-14

## 2021-05-13 RX ADMIN — BUPIVACAINE HYDROCHLORIDE 8 ML: 2.5 INJECTION, SOLUTION EPIDURAL; INFILTRATION; INTRACAUDAL; PERINEURAL at 22:42

## 2021-05-13 RX ADMIN — SODIUM CHLORIDE: 9 INJECTION, SOLUTION INTRAVENOUS at 09:00

## 2021-05-13 RX ADMIN — SODIUM CHLORIDE: 9 INJECTION, SOLUTION INTRAVENOUS at 17:30

## 2021-05-13 RX ADMIN — Medication 25 MCG: at 14:31

## 2021-05-13 RX ADMIN — Medication 8 ML/HR: at 22:46

## 2021-05-13 RX ADMIN — Medication 10 ML/HR: at 22:40

## 2021-05-13 RX ADMIN — Medication 25 MCG: at 10:23

## 2021-05-13 RX ADMIN — DEXTROSE MONOHYDRATE 2.5 MILLION UNITS: 50 INJECTION, SOLUTION INTRAVENOUS at 21:42

## 2021-05-13 RX ADMIN — Medication 1 MILLI-UNITS/MIN: at 20:17

## 2021-05-13 RX ADMIN — DEXTROSE MONOHYDRATE 2.5 MILLION UNITS: 50 INJECTION, SOLUTION INTRAVENOUS at 17:31

## 2021-05-13 RX ADMIN — DEXTROSE MONOHYDRATE 5 MILLION UNITS: 5 INJECTION INTRAVENOUS at 10:12

## 2021-05-13 ASSESSMENT — ENCOUNTER SYMPTOMS: STRIDOR: 0

## 2021-05-13 ASSESSMENT — PAIN SCALES - GENERAL: PAINLEVEL_OUTOF10: 3

## 2021-05-13 NOTE — PROGRESS NOTES
Labor Progress Note    Martha Arboleda is a 31 y.o. female [id number] at 39w0d  The patient was seen and examined. Her pain is well controlled. She reports fetal movement is present, complains of contractions, denies loss of fluid, denies vaginal bleeding. Spoke with patient that her last check baby was ballotable and we would like to start pitocin to help bring baby down and then break her water. Patient does not want to start pitocin at this time. Told her we would recheck in cervix in another 1-2 hr to assess for AROM.     Vital Signs:  Vitals:    05/13/21 1204 05/13/21 1434 05/13/21 1837 05/13/21 1932   BP: 112/82 117/65 130/65 (!) 119/59   Pulse: 80 68 77 88   Resp: 16 18  16   Temp: 97.9 °F (36.6 °C) 98.1 °F (36.7 °C) 98.4 °F (36.9 °C) 97.7 °F (36.5 °C)   TempSrc: Oral Infrared  Infrared   SpO2: 98% 98%     Weight:       Height:             FHT: 140, moderate variability, accelerations present, decelerations absent  Contractions: regular, every 4 minutes    Chaperone for Intimate Exam: N/A  Cervical Exam: deferred  Pitocin: @ 0 mu/min    Membranes: Intact  Scalp Electrode in place: absent  Intrauterine Pressure Catheter in Place: absent    Interventions: none    Assessment/Plan:  Martha Arboleda is a 31 y.o. female [id number] at 39w0d admitted for IOL 2/2 GDMA 1   - GBS positive, Pen G for GBS prophylaxis   - VSS, afebrile   - NS @ 125 ml/hr   - cEFM/TOCO- Cat 1   - S/p cytotec 25 PV  x2   - Patient declining pit at this time   - Continue to monitor        Attending updated and in agreement with plan    Joe Eid,   Ob/Gyn Resident  5/13/2021, 7:04 PM

## 2021-05-13 NOTE — DISCHARGE SUMMARY
Obstetric Discharge Summary  [facility]    Patient Name: Lilia Moreno  Patient : 1990  Primary Care Physician: KADEEM Brown - CNP  Admit Date: 2021    Principal Diagnosis: IUP at 39w0d, admitted for IOL 2/2 GDMA1     Her pregnancy has been complicated by:   Patient Active Problem List   Diagnosis    Pelvic pain     Hx of postpartum depression, currently pregnant    History of Thyroid Disorder (G3)    History of pulmonary hypertension (G3)    Fatty liver    Cyst of right ovary    ADHD    Hx GDMA1 (G7)    Rh+/RI/GBS+     21 M APG 9/9 Wt 8#2       Infection Present?: No  Hospital Acquired: N/A    Surgical Operations & Procedures:  [x] Pitocin Induction of Labor  [] Pitocin Augmentation of Labor  [x] Prostaglandin Induction of Labor  [] Mechanical Induction of Labor  [x] Artificial Rupture of Membranes  [] Intrauterine Pressure Catheter  [x] Fetal Scalp Electrode  [] Amnioinfusion  Analgesia: epidural  Delivery Type: Spontaneous Vaginal Delivery: See Labor and Delivery Summary   Laceration(s): Absent    Consultations: Anesthesia    Pertinent Findings & Procedures:   Lilia Moreno is a 31 y.o. female [id number] at 39w0d admitted for IOL 2/2 GDMA1; received PenG, Cytotec 25 mcg PV x 3, pitocin, epidural, AROM (clr), FSE. She delivered by induced vaginal a Live Born infant on 21. Information for the patient's :  Pocs, Baby Boy [employer] [[id number]]   male   Birth Weight: 8 lb 1.8 oz (3.68 kg)       Apgars: 9 at 1 minute and 9 at 5 minutes. Postpartum course:   PPD#1: Hgb 12.0, fasting blood sugar 78.  Post-epidural headache, patient given Fioricet and Mag oxide  PPD#2: Headache improved, patient was stable for discharge    Course of patient: uncomplicated    Discharge to: Home    Readmission planned: no     Recommendations on Discharge:     Medications:      Medication List      START taking these medications    docusate sodium 100 MG capsule  Commonly known as: COLACE  Take 1 capsule by mouth 2 times daily as needed for Constipation     ibuprofen 800 MG tablet  Commonly known as: ADVIL;MOTRIN  Take 1 tablet by mouth every 8 hours as needed for Pain        CONTINUE taking these medications    famotidine 20 MG tablet  Commonly known as: PEPCID  Take 1 tablet by mouth 2 times daily     ferrous sulfate 325 (65 Fe) MG tablet  Commonly known as: IRON 325  Take 1 tablet by mouth 2 times daily     PNV Prenatal Plus Multivitamin 27-1 MG Tabs  Take 1 tablet by mouth daily     promethazine 25 MG tablet  Commonly known as: PHENERGAN        ASK your doctor about these medications    ONE TOUCH ULTRA 2 w/Device Kit     OneTouch Delica Plus [id number] Misc     OneTouch Ultra strip  Generic drug: blood glucose test strips     RA Alcohol Swabs 70 % Pads           Where to Get Your Medications      You can get these medications from any pharmacy    Bring a paper prescription for each of these medications  · docusate sodium 100 MG capsule  · ibuprofen 800 MG tablet          Activity: pelvic rest x 6 weeks  Diet: regular diet  Follow up: 2 weeks, will need 6 week 2-hr GTT for GDMA1    Condition on discharge: stable    Discharge date: 5/16/21    Ashlie García DO  Ob/Gyn Resident    Comments:  Home care and follow-up care were reviewed. Pelvic rest, and birth control were reviewed. Signs and symptoms of mastitis and post partum depression were reviewed. The patient is to notify her physician if any of these occur. The patient was counseled on secondary smoke risks and the increased risk of sudden infant death syndrome and respiratory problems to her baby with exposure. She was counseled on various alternate recommendations to decrease the exposure to secondary smoke to her children.

## 2021-05-13 NOTE — FLOWSHEET NOTE
Patient presents to unit for scheduled induction of labor. Patient reports +FM. Denies leaking of fluid. Denies vaginal bleeding. Patient admitted to room 176. Monitor test completed/passed.

## 2021-05-13 NOTE — FLOWSHEET NOTE
Dr. Laurin Ormond at bedside. Leopolds maneuver performed by Dr. Laurin Ormond, Dr. Laurin Ormond confirms head is down and would like to proceed with cytotec at this time for induction.

## 2021-05-14 LAB
AMPHETAMINE SCREEN URINE: NEGATIVE
BARBITURATE SCREEN URINE: NEGATIVE
BENZODIAZEPINE SCREEN, URINE: NEGATIVE
BUPRENORPHINE URINE: NORMAL
CANNABINOID SCREEN URINE: NEGATIVE
COCAINE METABOLITE, URINE: NEGATIVE
GLUCOSE BLD-MCNC: 67 MG/DL (ref 65–105)
GLUCOSE BLD-MCNC: 68 MG/DL (ref 65–105)
GLUCOSE BLD-MCNC: 77 MG/DL (ref 65–105)
GLUCOSE BLD-MCNC: 87 MG/DL (ref 65–105)
MDMA URINE: NORMAL
METHADONE SCREEN, URINE: NEGATIVE
METHAMPHETAMINE, URINE: NORMAL
OPIATES, URINE: NEGATIVE
OXYCODONE SCREEN URINE: NEGATIVE
PHENCYCLIDINE, URINE: NEGATIVE
PROPOXYPHENE, URINE: NORMAL
TEST INFORMATION: NORMAL
TRICYCLIC ANTIDEPRESSANTS, UR: NORMAL

## 2021-05-14 PROCEDURE — 88307 TISSUE EXAM BY PATHOLOGIST: CPT

## 2021-05-14 PROCEDURE — 6360000002 HC RX W HCPCS: Performed by: SPECIALIST

## 2021-05-14 PROCEDURE — 6360000002 HC RX W HCPCS: Performed by: STUDENT IN AN ORGANIZED HEALTH CARE EDUCATION/TRAINING PROGRAM

## 2021-05-14 PROCEDURE — 1220000000 HC SEMI PRIVATE OB R&B

## 2021-05-14 PROCEDURE — 82947 ASSAY GLUCOSE BLOOD QUANT: CPT

## 2021-05-14 PROCEDURE — 6360000002 HC RX W HCPCS: Performed by: ANESTHESIOLOGY

## 2021-05-14 PROCEDURE — 10907ZC DRAINAGE OF AMNIOTIC FLUID, THERAPEUTIC FROM PRODUCTS OF CONCEPTION, VIA NATURAL OR ARTIFICIAL OPENING: ICD-10-PCS | Performed by: SPECIALIST

## 2021-05-14 PROCEDURE — 2580000003 HC RX 258: Performed by: SPECIALIST

## 2021-05-14 PROCEDURE — 6370000000 HC RX 637 (ALT 250 FOR IP): Performed by: STUDENT IN AN ORGANIZED HEALTH CARE EDUCATION/TRAINING PROGRAM

## 2021-05-14 PROCEDURE — 2500000003 HC RX 250 WO HCPCS

## 2021-05-14 PROCEDURE — 59400 OBSTETRICAL CARE: CPT | Performed by: SPECIALIST

## 2021-05-14 PROCEDURE — 59200 INSERT CERVICAL DILATOR: CPT

## 2021-05-14 PROCEDURE — 10H073Z INSERTION OF MONITORING ELECTRODE INTO PRODUCTS OF CONCEPTION, VIA NATURAL OR ARTIFICIAL OPENING: ICD-10-PCS | Performed by: SPECIALIST

## 2021-05-14 PROCEDURE — 3E0P7VZ INTRODUCTION OF HORMONE INTO FEMALE REPRODUCTIVE, VIA NATURAL OR ARTIFICIAL OPENING: ICD-10-PCS | Performed by: SPECIALIST

## 2021-05-14 PROCEDURE — 3E033VJ INTRODUCTION OF OTHER HORMONE INTO PERIPHERAL VEIN, PERCUTANEOUS APPROACH: ICD-10-PCS | Performed by: SPECIALIST

## 2021-05-14 PROCEDURE — 2500000003 HC RX 250 WO HCPCS: Performed by: ANESTHESIOLOGY

## 2021-05-14 PROCEDURE — 80307 DRUG TEST PRSMV CHEM ANLYZR: CPT

## 2021-05-14 RX ORDER — ONDANSETRON 4 MG/1
4 TABLET, ORALLY DISINTEGRATING ORAL EVERY 4 HOURS PRN
Status: DISCONTINUED | OUTPATIENT
Start: 2021-05-14 | End: 2021-05-16 | Stop reason: HOSPADM

## 2021-05-14 RX ORDER — ACETAMINOPHEN 500 MG
1000 TABLET ORAL EVERY 6 HOURS PRN
Status: DISCONTINUED | OUTPATIENT
Start: 2021-05-14 | End: 2021-05-16 | Stop reason: HOSPADM

## 2021-05-14 RX ORDER — SIMETHICONE 80 MG
80 TABLET,CHEWABLE ORAL EVERY 6 HOURS PRN
Status: DISCONTINUED | OUTPATIENT
Start: 2021-05-14 | End: 2021-05-16 | Stop reason: HOSPADM

## 2021-05-14 RX ORDER — DOCUSATE SODIUM 100 MG/1
100 CAPSULE, LIQUID FILLED ORAL 2 TIMES DAILY PRN
Qty: 60 CAPSULE | Refills: 1 | Status: SHIPPED | OUTPATIENT
Start: 2021-05-14 | End: 2021-05-27

## 2021-05-14 RX ORDER — DOCUSATE SODIUM 100 MG/1
100 CAPSULE, LIQUID FILLED ORAL 2 TIMES DAILY
Status: DISCONTINUED | OUTPATIENT
Start: 2021-05-14 | End: 2021-05-16 | Stop reason: HOSPADM

## 2021-05-14 RX ORDER — METHYLERGONOVINE MALEATE 0.2 MG/ML
200 INJECTION INTRAVENOUS ONCE
Status: COMPLETED | OUTPATIENT
Start: 2021-05-14 | End: 2021-05-14

## 2021-05-14 RX ORDER — IBUPROFEN 800 MG/1
800 TABLET ORAL EVERY 8 HOURS PRN
Qty: 30 TABLET | Refills: 0 | Status: SHIPPED | OUTPATIENT
Start: 2021-05-14 | End: 2021-05-27

## 2021-05-14 RX ORDER — IBUPROFEN 600 MG/1
600 TABLET ORAL EVERY 6 HOURS
Status: DISCONTINUED | OUTPATIENT
Start: 2021-05-14 | End: 2021-05-16 | Stop reason: HOSPADM

## 2021-05-14 RX ORDER — FENTANYL CITRATE 50 UG/ML
25 INJECTION, SOLUTION INTRAMUSCULAR; INTRAVENOUS
Status: DISCONTINUED | OUTPATIENT
Start: 2021-05-14 | End: 2021-05-14

## 2021-05-14 RX ORDER — EPHEDRINE SULFATE 50 MG/ML
10 INJECTION INTRAVENOUS EVERY 5 MIN PRN
Status: DISCONTINUED | OUTPATIENT
Start: 2021-05-14 | End: 2021-05-14

## 2021-05-14 RX ORDER — EPHEDRINE SULFATE 50 MG/ML
INJECTION INTRAVENOUS
Status: COMPLETED
Start: 2021-05-14 | End: 2021-05-14

## 2021-05-14 RX ORDER — LANOLIN 100 %
OINTMENT (GRAM) TOPICAL PRN
Status: DISCONTINUED | OUTPATIENT
Start: 2021-05-14 | End: 2021-05-16 | Stop reason: HOSPADM

## 2021-05-14 RX ADMIN — EPHEDRINE SULFATE 10 MG: 50 INJECTION INTRAVENOUS at 02:12

## 2021-05-14 RX ADMIN — Medication 10 ML/HR: at 19:30

## 2021-05-14 RX ADMIN — DEXTROSE MONOHYDRATE 2.5 MILLION UNITS: 50 INJECTION, SOLUTION INTRAVENOUS at 05:57

## 2021-05-14 RX ADMIN — IBUPROFEN 600 MG: 600 TABLET, FILM COATED ORAL at 22:28

## 2021-05-14 RX ADMIN — Medication 25 MCG: at 10:54

## 2021-05-14 RX ADMIN — FENTANYL CITRATE 25 MCG: 50 INJECTION INTRAMUSCULAR; INTRAVENOUS at 20:54

## 2021-05-14 RX ADMIN — DOCUSATE SODIUM 100 MG: 100 CAPSULE, LIQUID FILLED ORAL at 22:28

## 2021-05-14 RX ADMIN — EPHEDRINE SULFATE 10 MG: 50 INJECTION INTRAVENOUS at 06:28

## 2021-05-14 RX ADMIN — DEXTROSE MONOHYDRATE 2.5 MILLION UNITS: 50 INJECTION, SOLUTION INTRAVENOUS at 01:52

## 2021-05-14 RX ADMIN — Medication 500 ML: at 21:45

## 2021-05-14 RX ADMIN — SODIUM CHLORIDE: 9 INJECTION, SOLUTION INTRAVENOUS at 10:59

## 2021-05-14 RX ADMIN — Medication 1 MILLI-UNITS/MIN: at 17:10

## 2021-05-14 RX ADMIN — METHYLERGONOVINE MALEATE 200 MCG: 0.2 INJECTION, SOLUTION INTRAMUSCULAR; INTRAVENOUS at 21:48

## 2021-05-14 RX ADMIN — DEXTROSE MONOHYDRATE 2.5 MILLION UNITS: 50 INJECTION, SOLUTION INTRAVENOUS at 16:57

## 2021-05-14 RX ADMIN — DEXTROSE MONOHYDRATE 2.5 MILLION UNITS: 50 INJECTION, SOLUTION INTRAVENOUS at 11:01

## 2021-05-14 ASSESSMENT — PAIN SCALES - GENERAL: PAINLEVEL_OUTOF10: 0

## 2021-05-14 NOTE — ANESTHESIA PRE PROCEDURE
Continuous PRN Teresa Arevalo MD        ondansetron [facility]) injection 4 mg  4 mg Intravenous Q6H PRN Teresa Arevalo MD        docusate sodium (COLACE) capsule 100 mg  100 mg Oral BID Teresa Arevalo MD        sodium chloride flush 0.9 % injection 5-40 mL  5-40 mL Intravenous 2 times per day Teresa Arevalo MD        sodium chloride flush 0.9 % injection 5-40 mL  5-40 mL Intravenous PRN Teresa Arevalo MD        ondansetron [facility]) injection 4 mg  4 mg Intravenous Q6H PRN Teresa Arevalo MD        acetaminophen (TYLENOL) tablet 650 mg  650 mg Oral Q4H PRN Teresa Arevalo MD        benzocaine-menthol (DERMOPLAST) 20-0.5 % spray   Topical PRN Teresa Arevalo MD        0.9 % sodium chloride infusion   Intravenous Continuous Teresa Arevalo  mL/hr at 05/13/21 1730 New Bag at 05/13/21 1730    penicillin G potassium 2.5 Million Units in dextrose 5 % 100 mL IVPB  2.5 Million Units Intravenous Q4H Teresa Arevalo MD   Stopped at 05/13/21 2212    oxytocin (PITOCIN) 30 units in 500 mL infusion  1-20 cheyenne-units/min Intravenous Continuous Trev Go, DO 2 mL/hr at 05/13/21 2051 2 [place]yenne-units/min at 05/13/21 2051    naloxone (NARCAN) injection 0.4 mg  0.4 mg Intravenous PRN Emily Torrez MD        nalbuphine (NUBAIN) injection 5 mg  5 mg Intravenous Q4H PRN Emily Torrez MD        ondansetron (ZOFRAN) injection 4 mg  4 mg Intravenous Q6H PRN Emily Torrez MD        fentaNYL 2 mcg/mL and ropivacaine 0.2% in sodium chloride 0.9% 100 mL (OB) epidural  10 mL/hr Epidural Continuous Emily Torrez MD 10 mL/hr at 05/13/21 2240 10 mL/hr at 05/13/21 2240     Facility-Administered Medications Ordered in Other Encounters   Medication Dose Route Frequency Provider Last Rate Last Admin    fentaNYL 2 mcg/mL and ropivacaine 0.2% in sodium chloride 0.9% 100 mL (OB) epidural    Continuous PRN Emily Torrez MD 8 mL/hr at 05/13/21 2246 8 mL/hr at 05/13/21 2246    bupivacaine (PF) (MARCAINE) 0.25 % injection   Epidural PRN Mann Nelson MD   8 mL at 212       Allergies:     Allergies   Allergen Reactions    Topamax [Topiramate] Other (See Comments)     paralysis    Zoloft [Sertraline Hcl] Other (See Comments)     confusion       Problem List:    Patient Active Problem List   Diagnosis Code    Pelvic pain  O26.899, R10.2    High-risk pregnancy in third trimester O09.93    Hx of postpartum depression, currently pregnant O99.891, Z86.59    History of Thyroid Disorder (G3) E07.9    History of pulmonary hypertension (G3) Z86.79    Fatty liver K76.0    Cyst of right ovary N83.201    ADHD F90.9    Diet controlled gestational diabetes mellitus (GDM) in third trimester O24.410    Rh+/RI/GBS+ O09.93       Past Medical History:        Diagnosis Date    ADHD 2021    Anemia     Anemia of pregnancy- started on Iron    Complication of anesthesia      with epidural she has numbness on only one side, but has had 2 other epidurals without incidence    Diet controlled gestational diabetes mellitus (GDM) in third trimester 3/25/2021    Fatty liver 2018    History of Thyroid Disorder 9/3/2014    hyperthyroidism with 3rd pregnancy but has resolved    Ovarian cyst     Postpartum depression      and was on meds for short term       Past Surgical History:        Procedure Laterality Date    CHOLECYSTECTOMY         Social History:    Social History     Tobacco Use    Smoking status: Former Smoker     Quit date: 10/18/2020     Years since quittin.5    Smokeless tobacco: Never Used   Substance Use Topics    Alcohol use: Not Currently     Alcohol/week: 0.0 standard drinks     Comment: occasional                                Counseling given: Not Answered      Vital Signs (Current):   Vitals:    21 2120 21 2152 21 2220 21   BP: (!) 110/57 (!) 121/58 115/62 109/60   Pulse: 68 72 74 74   Resp:       Temp:       TempSrc:       SpO2:       Weight: Height:                                                  BP Readings from Last 3 Encounters:   05/13/21 109/60   05/06/21 119/68   04/29/21 102/69       NPO Status:                                                                                 BMI:   Wt Readings from Last 3 Encounters:   05/13/21 210 lb (95.3 kg)   05/06/21 210 lb (95.3 kg)   04/29/21 210 lb 9.6 oz (95.5 kg)     Body mass index is 39.68 kg/m².     CBC:   Lab Results   Component Value Date    WBC 10.3 05/13/2021    RBC 3.83 05/13/2021    RBC 4.23 10/23/2011    HGB 11.7 05/13/2021    HCT 34.4 05/13/2021    MCV 89.8 05/13/2021    RDW 13.4 05/13/2021     05/13/2021     10/23/2011       CMP:   Lab Results   Component Value Date     05/13/2021    K 3.9 05/13/2021     05/13/2021    CO2 21 05/13/2021    BUN 9 05/13/2021    CREATININE 0.63 05/13/2021    GFRAA >60 05/13/2021    LABGLOM >60 05/13/2021    GLUCOSE 74 05/13/2021    GLUCOSE 92 10/23/2011    PROT 6.0 05/13/2021    CALCIUM 8.8 05/13/2021    BILITOT 0.20 05/13/2021    ALKPHOS 160 05/13/2021    AST 14 05/13/2021    ALT 11 05/13/2021       POC Tests:   Recent Labs     05/13/21 1959   POCGLU 99       Coags:   Lab Results   Component Value Date    PROTIME 9.5 03/16/2016    INR 0.9 03/16/2016    APTT 24.4 03/16/2016       HCG (If Applicable):   Lab Results   Component Value Date    PREGTESTUR POSITIVE 10/21/2020    HCGQUANT 82,689 (H) 10/21/2020        ABGs: No results found for: PHART, PO2ART, THK2HVY, ODG5EQG, BEART, K5XEWBIY     Type & Screen (If Applicable):  No results found for: LABABO, LABRH    Drug/Infectious Status (If Applicable):  Lab Results   Component Value Date    HEPCAB NONREACTIVE 02/13/2019       COVID-19 Screening (If Applicable):   Lab Results   Component Value Date    COVID19 Not Detected 05/06/2021           Anesthesia Evaluation  Patient summary reviewed and Nursing notes reviewed no history of anesthetic complications:   Airway: Mallampati: III  TM distance: >3 FB   Neck ROM: full  Mouth opening: > = 3 FB Dental:          Pulmonary:Negative Pulmonary ROS breath sounds clear to auscultation      (-) rhonchi, wheezes, rales and stridor                           Cardiovascular:Negative CV ROS        (-) murmur, weak pulses,  friction rub, systolic click, carotid bruit,  JVD and peripheral edema      Rhythm: regular  Rate: normal                    Neuro/Psych:   (+) psychiatric history:            GI/Hepatic/Renal:   (+) liver disease:,          ROS comment: Fatty liver. Endo/Other:        (-) diabetes mellitus               Abdominal:           Vascular: negative vascular ROS. Anesthesia Plan      epidural     ASA 2             Anesthetic plan and risks discussed with patient.                       Emily Torrez MD   5/13/2021

## 2021-05-14 NOTE — LACTATION NOTE
Lactation round made in early labor. Mother wishes to try to breastfeed. Mothers longest duration was 3 weeks. Mother states they have a family history of not producing milk no matter what they try. Writer called [facility] for a breast pump. Godwin Moreno will call back with confirmation if she is approved. Writer educates on sleepy phase and nursing every 3 hours and encourages mother to put baby to breast when cluster feeding. Writer educates that pacifiers and bottle nipples can cause nipple confusion and not aid in milk supply establishment. Writer verbalizes understanding. Writer encourages mother to call out for breast feeding questions and assistance. Handouts given and explained to mother:  Breastfeeding resource Guide; Breastfeeding Log for the first week; When to Call a Lactation Consultant, Colostrum First, Norms in the First 3 days; feeding cues; Baby's second Night; [facility]'s inside track, a resource for breastfeeding mothers: Milk Expression and Pumping; How to Achieve a Deep Latch; Tips for breastfeeding Moms/Daily meal plan; [facility]'s Inside Track, a resource for breastfeeding mother: Managing Your Milk Supply:Going with the Flow;  [facility]'s Inside Track, a resource for breastfeeding mothers: Using Your Hands to Express Your Milk; Signs of a Good Feeding, Signs of a Poor Feeding. Discussed handouts with mother verbalizing understanding and encouraged mother  to view video clips.

## 2021-05-14 NOTE — ANESTHESIA PROCEDURE NOTES
Epidural Block    Patient location during procedure: OB  Start time: 5/13/2021 10:25 PM  End time: 5/13/2021 10:46 PM  Reason for block: labor epidural  Staffing  Performed: anesthesiologist   Anesthesiologist: Albert Pena MD  Preanesthetic Checklist  Completed: patient identified, IV checked, site marked, risks and benefits discussed, surgical consent, monitors and equipment checked, pre-op evaluation, timeout performed, anesthesia consent given, oxygen available and patient being monitored  Epidural  Patient position: sitting  Prep: Betadine  Patient monitoring: cardiac monitor, continuous pulse ox and frequent blood pressure checks  Approach: midline  Location: lumbar (1-5)  Injection technique: PAULA air and PAULA saline  Provider prep: mask and sterile gloves  Needle  Needle type: Tuohy   Needle gauge: 17 G  Needle length: 3.5 in  Needle insertion depth: 7 cm  Catheter type: end hole  Catheter size: 19 G  Catheter at skin depth: 11 cm  Test dose: negative  Assessment  Sensory level: T8  Hemodynamics: stable  Attempts: 1  Additional Notes  Pt has felt pain in the right side. She was very uncomfortable and requested a re-do epidural. Combined epidural was performed under sterile conditions. Pt was comfortable after intrathecal 25 mcg fent and 2.5  Mg bupi. Needle depth at 6 cm, catheter at 11 cm. Without complications.

## 2021-05-14 NOTE — H&P
OBSTETRICAL HISTORY [address]    Date: 2021       Time: 5:51 PM   Patient Name: Rock Ahuja     Patient : 1990  Room/Bed: [facility]/2475-95    Admission Date/Time: 2021  8:06 AM      CC: IOL 2/2 GDMA1       H&P Completed with Attending Physician-Late Entry    HPI: Rock Ahuja is a 31 y.o. [id number] at 39w1d who presented for IOL 2/2 GDMA1. The patient reports fetal movement is present, complains of occasional ontractions, denies loss of fluid, denies vaginal bleeding. She denies fever, chills headache, change in vision, RUQ pain, N/V, urinary symptoms or change in vaginal discharge. Pregnancy is complicated by hypothyroidism, hx pulmonary hypertension, Hx lap alexus, fatty liver, hx pp depression, obesity. DATING:  LMP: No LMP recorded (lmp unknown). Patient is pregnant.   Estimated Date of Delivery: 21   Based on: early ultrasound, at 10 6/7 weeks GA    PREGNANCY RISK FACTORS:  Patient Active Problem List   Diagnosis    Pelvic pain     High-risk pregnancy in third trimester    Hx of postpartum depression, currently pregnant    History of Thyroid Disorder (G3)    History of pulmonary hypertension (G3)    Fatty liver    Cyst of right ovary    ADHD    Diet controlled gestational diabetes mellitus (GDM) in third trimester    Rh+/RI/GBS+        Steroids Given In This Pregnancy:  no     REVIEW OF SYSTEMS:   Constitutional: negative fever, negative chills, negative weight changes   HEENT: negative visual disturbances, negative headaches, negative dizziness, negative hearing loss  Breast: Negative breast abnormalities, negative breast lumps, negative nipple discharge  Respiratory: negative dyspnea, negative cough, negative SOB  Cardiovascular: negative chest pain,  negative palpitations, negative arrhythmia, negative syncope   Gastrointestinal:positive abdominal pain, negative RUQ pain, negative N/V, negative diarrhea, negative constipation, negative bowel changes, negative heartburn   Genitourinary: negative dysuria, negative hematuria, negative urinary incontinence, negative vaginal discharge, negative vaginal bleeding or spotting  Dermatological: negative rash, negative pruritis, negative mole or other skin changes  Hematologic: negative bruising  Immunologic/Lymphatic: negative recent illness, negative recent sick contact  Musculoskeletal: negative back pain, negative myalgias, negative arthralgias  Neurological:  negative dizziness, negative migraines, negative seizures, negative weakness  Behavior/Psych: negative depression, negative anxiety, negative SI, negative HI    OBSTETRICAL HISTORY:   OB History    Para Term  AB Living   7 4 4 0 2 4   SAB TAB Ectopic Molar Multiple Live Births   2 0 0 0 0 4      # Outcome Date GA Lbr [place]/2nd Weight Sex Delivery Anes PTL Lv   7 Current            6 2019           5 Term 14 39w0d  9 lb 13 oz (4.451 kg) M Vag-Spont EPI N SHENG   4 Term 13 38w0d  7 lb 6 oz (3.345 kg) F Vag-Spont EPI N SHENG      Complications: Pulmonary hypertension (HCC)   3 Term 01/05/10 39w0d  6 lb 14 oz (3.118 kg) F Vag-Spont EPI N SHENG   2 2009           1 Term 10/18/07 39w0d  8 lb 7 oz (3.827 kg) F Vag-Spont EPI N SHENG       PAST MEDICAL HISTORY:   has a past medical history of ADHD, Anemia, Complication of anesthesia, Diet controlled gestational diabetes mellitus (GDM) in third trimester, Fatty liver, History of Thyroid Disorder, Ovarian cyst, and Postpartum depression. PAST SURGICAL HISTORY:   has a past surgical history that includes Cholecystectomy (). ALLERGIES:  is allergic to topamax [topiramate] and zoloft [sertraline hcl]. MEDICATIONS:  Prior to Admission medications    Medication Sig Start Date End Date Taking?  Authorizing Provider   famotidine (PEPCID) 20 MG tablet Take 1 tablet by mouth 2 times daily 21  Yes Bubba Handley MD   promethazine (PHENERGAN) 25 MG tablet Take 25 mg by mouth every 6 hours as needed for Nausea   Yes Historical Provider, MD   Prenatal Vit-Fe Fumarate-FA (PNV PRENATAL PLUS MULTIVITAMIN) 27-1 MG TABS Take 1 tablet by mouth daily 10/21/20 10/16/21 Yes KADEEM Guerrero CNP   RA Alcohol Swabs 70 % PADS USE TO CLEANSE AREA BEFORE INJECTING INSULIN AND BEFORE TESTING BLOOD SUGAR 3/23/21   Historical Provider, MD   Blood Glucose Monitoring Suppl (ONE TOUCH ULTRA 2) w/Device KIT use as directed 3/23/21   Historical Provider, MD   ONETOUCH ULTRA strip use 1 TEST STRIP to TEST BLOOD SUGAR four times a day 3/23/21   Historical Provider, MD   Lancets ([place]) [address] [facility] use 1 LANCET to TEST BLOOD SUGAR four times a day 3/23/21   Historical Provider, MD   ferrous sulfate (IRON 325) 325 (65 Fe) MG tablet Take 1 tablet by mouth 2 times daily 2/25/21 3/29/21  KADEEM Guerrero CNP       FAMILY HISTORY:  family history is not on file. SOCIAL HISTORY:   reports that she quit smoking about 6 months ago. She has never used smokeless tobacco. She reports previous alcohol use. She reports that she does not use drugs.     VITALS:  Vitals:    05/14/21 1737 05/14/21 1740 05/14/21 1742 05/14/21 1745   BP:    105/63   Pulse:    75   Resp:    16   Temp:    97.5 °F (36.4 °C)   TempSrc:    Infrared   SpO2: 98% 98% 98% 98%   Weight:       Height:            PHYSICAL EXAM:  Fetal Heart Monitor:  Baseline Heart Rate 140, moderate variability, present accelerations, absent decelerations  [place]: contractions, rare    Completed by Attending Physician  General appearance:  no apparent distress, alert and cooperative  HEENT: head atraumatic, normocephalic, moist mucous membranes, trachea midline  Neurologic:  alert, oriented, normal speech, no focal findings or movement disorder noted  Lungs:  No increased work of breathing, good air exchange, clear to auscultation bilaterally, no crackles or wheezing  Heart:  regular rate and rhythm and no murmur, rubs, gallops  Abdomen: soft, gravid, non-tender, no rebound, guarding, or rigidity, no RUQ or epigastric tenderness, no signs or symptoms of abruption, no signs or symptoms of chorioamnionitis  Extremities:  no calf tenderness, non edematous, no varicosities, full range of motion in all four extremities  Musculoskeletal: Gross strength equal and intact throughout, no gross abnormalities, range of motion normal in hips, knees, shoulders and spine  Psychiatric: Mood appropriate, normal affect   Rectal Exam: not indicated  Pelvic Exam: completed by Charly Pepper RN  Sterile Vaginal Exam:  Cervix: No cervical motion tenderness   Uterus: Is gravid, Normal size, shape, consistency and non-tender   Adnexa: Non-tender, no palpable masses  Cervix: 2 cm dilated, 60 % effaced, -2 station      PRENATAL LAB RESULTS:   Blood Type/Rh: A pos  Antibody Screen: negative  Hemoglobin, Hematocrit, Platelets: 08.2 / 13.3 / 282  Rubella: immune  T.  Pallidum, IgG: non-reactive   Hepatitis B Surface Antigen: non-reactive   HIV: non-reactive   Sickle Cell Screen: negative  Gonorrhea: negative  Chlamydia: negative  Urine culture: not done    Early 1 hour Glucose Tolerance Test: 137  1 hour Glucose Tolerance Test: 159  3 hour Glucose Tolerance Test: Fastin; 1 hour: 193; 2 hour  195; 3 hour: 141    Group B Strep: positive  Cystic Fibrosis Screen: negative  First Trimester Screen: not available  Quad: normal  Non-Invasive Prenatal Testing: not available  Anatomy US: no fetal abnormalities, anterior placenta, 3VC normal insertion    ASSESSMENT & PLAN:  Patti Britt is a 31 y.o. female [id number] at [address] 54 2/2 GDMA1, late entry   - GBS positive / Rh positive / R immune   - Pen G for GBS prophylaxis   - VSS throughout stay, afebrile   - cEFM/TOCO   - CBC, T&S, Tpal, UDS ordered   - COVID neg 21   - IVF: NS @125ml/h4   - Diabetic Diet   - SVE per RN on admission /-2   - Induction method: Cytotec 25mcg PV    Hypothyroidism   - Currently stable on no meds   - TSH wnl on 2/24/21   - Asymptomatic    Hx Pulmonary HTN   - Pt reports diagnosed in 2013   - Echo compmleted 12/7/21 demonstrates mild pulmonary hypertension   - Asymptomatic    Hx Lap Geeta   - 2011    Fatty Liver   - Hx elevated LFTS, noted on previous imaging   - CMP completed 1/16/21 demonstrates normal LFTs    Hx PP Depression   - Denies SI/HI   - SW consult PP    BMI 39.68   - Failed early 1hr GTT, did not complete early 3hr GTT    Patient Active Problem List    Diagnosis Date Noted    Rh+/RI/GBS+ 05/13/2021    Diet controlled gestational diabetes mellitus (GDM) in third trimester 03/25/2021    ADHD 01/04/2021     The patient was on Concerta      Fatty liver 06/06/2018     She had elevated LFTs that have normalized. She followed with GI. Work up for Intrinsic hepatic disease was negative. Severe hepatic steatosis seen on Ct on 5/2018      Cyst of right ovary 06/06/2018    History of Thyroid Disorder (G3) 09/03/2014     Per patient she was diagnosed with hyperthyroidism in third pregnancy and was on medications. No issues outside of pregnancy. Not currently on meds.  History of pulmonary hypertension (G3) 09/03/2014     Echo 12/7/20: EF 55%, RV pressure 32 mmg indicated mild pulm HTN     History of Pulmonary HTN in prior pregnancy- normal ECHO in 2014 per patient      Pelvic pain  07/15/2014     Musculoskeletal. Pubic Symphysis strain  Flexeril and pregnancy support belt given 07/15/2014        High-risk pregnancy in third trimester 07/15/2014     Rh+/RI/GBS neg  Quad screen: WNL  Male      Hx of postpartum depression, currently pregnant 07/15/2014       Plan discussed with Dr. Grace Rojas, who is agreeable. Steroids given this admission: No    Risks, benefits, alternatives and possible complications have been discussed in detail with the patient. Admission, and post admission procedures and expectations were discussed in detail. All questions were answered.     Attending's Name:  Eris Caceres DO  Ob/Gyn Resident  5/14/2021, 5:51 PM

## 2021-05-14 NOTE — PROGRESS NOTES
Labor Progress Note    Carolin Burris is a 31 y.o. female [id number] at 39w0d  The patient was seen and examined. Her pain is well controlled with an epidural. She reports fetal movement is present, complains of contractions, denies loss of fluid, denies vaginal bleeding.        Vital Signs:  Vitals:    05/13/21 2120 05/13/21 2152 05/13/21 2220 05/13/21 2239   BP: (!) 110/57 (!) 121/58 115/62 109/60   Pulse: 68 72 74 74   Resp:       Temp:       TempSrc:       SpO2:       Weight:       Height:             FHT: 130, moderate variability, accelerations present, decelerations absent  Contractions: regular, every 2-5 minutes    Chaperone for Intimate Exam: Lennie SANTIAGO  Cervical Exam: 4/70/ballotable  Pitocin: @ 2 mu/min    Membranes: Intact  Scalp Electrode in place: absent  Intrauterine Pressure Catheter in Place: absent    Interventions: none    Assessment/Plan:  Carolin Burris is a 31 y.o. female [id number] at 39w0d admitted for IOL 2/2 GDMA 1   - GBS positive, Pen G for GBS prophylaxis   - VSS, afebrile   - NS @ 125 ml/hr   - cEFM/TOCO- Cat 1   - Pitocin currently at 2   - S/p cytotec 25 PV  x2   - Epidural in place   - Will plan to AROM when able   - Continue to monitor    Attending updated and in agreement with plan    Frank Zamora DO  Ob/Gyn Resident  5/14/2021, 12:12 AM

## 2021-05-14 NOTE — PROGRESS NOTES
Labor Progress Note    Estrella Skinner is a 31 y.o. female [id number] at 39w1d  The patient was seen and examined. Her pain is well controlled. She reports fetal movement is present, complains of contractions, complains of loss of fluid, denies vaginal bleeding. AROM performed by attending physician. Clear fluid noted. SVE performed and FSE placed due to difficulty tracing FHT. Patient tolerated well. Vital Signs:  Vitals:    05/14/21 1131 05/14/21 1230 05/14/21 1409 05/14/21 1645   BP: 119/70 110/62 122/72 111/65   Pulse: 87 89 92 78   Resp: 16 16 16 16   Temp: 97.2 °F (36.2 °C) 97.3 °F (36.3 °C) 97.5 °F (36.4 °C) 97.5 °F (36.4 °C)   TempSrc: Infrared Infrared Infrared Infrared   SpO2:       Weight:       Height:          FHT reviewed throughout the day. Currently category 1, some occasional variable decelerations noted previously which have resolved.     FHT: 130, moderate variability, accelerations present, decelerations absent  Contractions: irregular, every 5-12 minutes    Chaperone for Intimate Exam: Chaperone was present for entire exam, Chaperone Name: Roselle Brittle, RN  Cervical Exam: 4 cm dilated, 70 effaced, -2 station  Pitocin: @ 0 mu/min    Membranes: Ruptured clear fluid  Scalp Electrode in place: present  Intrauterine Pressure Catheter in Place: absent    Interventions: FSE placed due to difficulty tracing, patient tolerated well    Assessment/Plan:  Estrella Skinner is a 31 y.o. female [id number] at 39w1d admitted for IOL 2/2 GDMA1   - GBS positive, Pen G for GBS prophylaxis   - VSS, afebrile   - cEFM/TOCO   - S/p cytotec 25mcg PV x3   - Currently not on pitocin   - AROM performed with clear fluid by attending physician at [phone]   - FSE placed, patient tolerated well   - Epidural in place, not currently on, will call Anesthesia to re-start   - Restart pitocin per protocol    GDMA1   - Currently NPO   - Will check POCT q4h    Attending updated and in agreement with plan    Teodorasabas Hernandez, DO  Ob/Gyn Resident  5/14/2021, 4:58 PM

## 2021-05-14 NOTE — FLOWSHEET NOTE
2200 Anesthesia notified of request for epidural.  2225 -  Dr. Patricia Herron at bedside and consent form signed. Everyone in room is wearing a mask. Risks and benefits discussed and patient verbalizes understanding. Patient verbalizes to proceed with procedure. Time out performed. 2225 - to dangle position. 2229  - procedure started. 2234  - test dose given. Patient tolerated procedure well. 2237 -to low fowlers position with hip wedge in place. 2238  -first dose given. 2242 - epidural pump started. See anesthesia note.

## 2021-05-15 PROBLEM — O09.90 HRP (HIGH RISK PREGNANCY): Status: ACTIVE | Noted: 2021-05-15

## 2021-05-15 LAB
ABSOLUTE EOS #: 0 K/UL (ref 0–0.4)
ABSOLUTE IMMATURE GRANULOCYTE: ABNORMAL K/UL (ref 0–0.3)
ABSOLUTE LYMPH #: 1.32 K/UL (ref 1–4.8)
ABSOLUTE MONO #: 1.47 K/UL (ref 0.1–1.3)
BASOPHILS # BLD: 0 % (ref 0–2)
BASOPHILS ABSOLUTE: 0 K/UL (ref 0–0.2)
DIFFERENTIAL TYPE: ABNORMAL
EOSINOPHILS RELATIVE PERCENT: 0 % (ref 0–4)
GLUCOSE BLD-MCNC: 72 MG/DL (ref 65–105)
HCT VFR BLD CALC: 35.5 % (ref 36–46)
HEMOGLOBIN: 12 G/DL (ref 12–16)
IMMATURE GRANULOCYTES: ABNORMAL %
LYMPHOCYTES # BLD: 9 % (ref 24–44)
MCH RBC QN AUTO: 30.2 PG (ref 26–34)
MCHC RBC AUTO-ENTMCNC: 33.8 G/DL (ref 31–37)
MCV RBC AUTO: 89.5 FL (ref 80–100)
MONOCYTES # BLD: 10 % (ref 1–7)
MORPHOLOGY: ABNORMAL
NRBC AUTOMATED: ABNORMAL PER 100 WBC
PDW BLD-RTO: 13.3 % (ref 11.5–14.9)
PLATELET # BLD: 227 K/UL (ref 150–450)
PLATELET ESTIMATE: ABNORMAL
PMV BLD AUTO: 10.1 FL (ref 6–12)
RBC # BLD: 3.97 M/UL (ref 4–5.2)
RBC # BLD: ABNORMAL 10*6/UL
SEG NEUTROPHILS: 81 % (ref 36–66)
SEGMENTED NEUTROPHILS ABSOLUTE COUNT: 11.91 K/UL (ref 1.3–9.1)
WBC # BLD: 14.7 K/UL (ref 3.5–11)
WBC # BLD: ABNORMAL 10*3/UL

## 2021-05-15 PROCEDURE — 1220000000 HC SEMI PRIVATE OB R&B

## 2021-05-15 PROCEDURE — 59200 INSERT CERVICAL DILATOR: CPT

## 2021-05-15 PROCEDURE — 85025 COMPLETE CBC W/AUTO DIFF WBC: CPT

## 2021-05-15 PROCEDURE — 82947 ASSAY GLUCOSE BLOOD QUANT: CPT

## 2021-05-15 PROCEDURE — 6370000000 HC RX 637 (ALT 250 FOR IP): Performed by: STUDENT IN AN ORGANIZED HEALTH CARE EDUCATION/TRAINING PROGRAM

## 2021-05-15 PROCEDURE — 7200000001 HC VAGINAL DELIVERY

## 2021-05-15 PROCEDURE — 36415 COLL VENOUS BLD VENIPUNCTURE: CPT

## 2021-05-15 PROCEDURE — 99024 POSTOP FOLLOW-UP VISIT: CPT | Performed by: SPECIALIST

## 2021-05-15 RX ORDER — BUTALBITAL, ACETAMINOPHEN AND CAFFEINE 300; 40; 50 MG/1; MG/1; MG/1
1 CAPSULE ORAL EVERY 6 HOURS PRN
Status: DISCONTINUED | OUTPATIENT
Start: 2021-05-15 | End: 2021-05-16 | Stop reason: HOSPADM

## 2021-05-15 RX ADMIN — DOCUSATE SODIUM 100 MG: 100 CAPSULE, LIQUID FILLED ORAL at 11:19

## 2021-05-15 RX ADMIN — ACETAMINOPHEN 1000 MG: 500 TABLET ORAL at 03:10

## 2021-05-15 RX ADMIN — BUTALBITA,ACETAMINOPHEN AND CAFFEINE 1 CAPSULE: 50; 300; 40 CAPSULE ORAL at 04:26

## 2021-05-15 RX ADMIN — BENZOCAINE AND LEVOMENTHOL: 200; 5 SPRAY TOPICAL at 00:16

## 2021-05-15 RX ADMIN — IBUPROFEN 600 MG: 600 TABLET, FILM COATED ORAL at 17:40

## 2021-05-15 RX ADMIN — IBUPROFEN 600 MG: 600 TABLET, FILM COATED ORAL at 11:19

## 2021-05-15 RX ADMIN — DOCUSATE SODIUM 100 MG: 100 CAPSULE, LIQUID FILLED ORAL at 22:00

## 2021-05-15 ASSESSMENT — PAIN DESCRIPTION - LOCATION: LOCATION: ABDOMEN

## 2021-05-15 ASSESSMENT — PAIN SCALES - GENERAL
PAINLEVEL_OUTOF10: 3
PAINLEVEL_OUTOF10: 3
PAINLEVEL_OUTOF10: 4

## 2021-05-15 ASSESSMENT — PAIN DESCRIPTION - PAIN TYPE
TYPE: ACUTE PAIN
TYPE: ACUTE PAIN

## 2021-05-15 NOTE — LACTATION NOTE
This note was copied from a baby's chart. Assisted mother with first feeding in recovery. Using cross cradle hold baby latches on well and continues to nurse well with some swallowing noted. Mother denies nipple pain with nursing. Handouts given-  Breastfeeding Resource Guide including links to  video clips on:  (Hand expression , Breast feeding Positions & Latch ),Breastfeeding feeding Norms the first 3 days of life,Feeding Diary,  ILCA Managing Your Milk Supply: Going with the Flow, Feeding Cues, Second Night,[place] Tips for Breastfeeding Moms, Best Start- Mixing Alcohol and Breastfeeding , [employer]Track- Milk Hand Expresssion and Pumping handouts given with demonstration of hand expression and Signs of a Good Feeding handout. Discussed handouts with mother verbalizing understanding and encouraged mother  to view video clips.

## 2021-05-15 NOTE — PLAN OF CARE
Problem: Pain:  Goal: Control of acute pain  Description: Control of acute pain  Outcome: Ongoing  Goal: Control of chronic pain  Description: Control of chronic pain  Outcome: Completed

## 2021-05-15 NOTE — PROGRESS NOTES
POST PARTUM DAY # 1    Lety Manuel is a 31 y.o. female  This patient was seen & examined today.  21    Her pregnancy was complicated by:   Patient Active Problem List   Diagnosis    Pelvic pain     High-risk pregnancy in third trimester    Hx of postpartum depression, currently pregnant    History of Thyroid Disorder (G3)    History of pulmonary hypertension (G3)    Fatty liver    Cyst of right ovary    ADHD    Diet controlled gestational diabetes mellitus (GDM) in third trimester    Rh+/RI/GBS+     21 M APG 9/9 Wt 8#2       Today she is doing well today. She is complaining of a mild headache on the left which she attributes to her epidural. Her lochia is light. She denies chest pain, shortness of breath, lightheadedness and blurred vision. She is breast feeding and she denies any breast tenderness. She is ambulating well. Her voiding pattern is normal. I reviewed signs and symptoms of post partum depression with the patient, she currently denies any of these symptoms. She is tolerating solids.      Vital Signs:  Vitals:    21 2324 21 2339 05/15/21 0009 05/15/21 0038   BP: (!) 105/58 (!) 113/57 114/64 (!) 109/58   Pulse: 69 85 87 82   Resp: 16 16     Temp:  97.7 °F (36.5 °C)     TempSrc:  Infrared     SpO2:       Weight:       Height:            Physical Exam:  General:  no apparent distress, alert and cooperative  Neurologic:  alert, oriented, normal speech, no focal findings or movement disorder noted  Lungs:  No increased work of breathing, good air exchange, clear to auscultation bilaterally, no crackles or wheezing  Heart:  Normal apical impulse, regular rate and rhythm, normal S1 and S2, no S3 or S4, and no murmur noted    Abdomen: abdomen soft, non-distended, non-tender  Fundus: non-tender, firm, below umbilicus  Extremities:  no calf tenderness, non edematous    Lab:  Lab Results   Component Value Date    HGB 12.0 05/15/2021     Lab Results   Component Value Date HCT 35.5 (L) 05/15/2021       Assessment/Plan:  1. Toshia Jackson is a [id number] PPD # 1 s/p    - Doing well, VSS, afebrile   - Male infant in [address], circumcision desired   - Encourage ambulation   - Postpartum Hgb/Hct completed and stable, Hgb of 12.0   - Motrin/Tylenol for pain   - S/p Methergine x1 immediately after delivery due to risk factors for PPH  2. Rh positive/Rubella immune  3. Breast feeding    - Denies s/s mastitis  4. Headache   - Patient complains of mild headache on the left this AM, attributes to epidural   - Received tylenol and motrin, fioricet ordered   - Mg Oxide PRN if needed   - Vitals stable, denies other s/s preE  5. GDMA1   - FBS ordered this AM   - Patient previously not on medication, recommend 6wk PP 2hr GTT  6. Hypothyroidism   - Stable on no meds   - Recommend close outpatient follow up  7. Hx Pulmonary HTN   - Mild pulmonary hypertension noted on last echo in 2020   - Encourage close outpatient follow up  8. Fatty Liver   - Recommend follow up with GI as needed   - Last LFTs wnl  9. Hx PP Depression   - SW consult ordered   - Denies current s/s postpartum depression, recommend close outpatient follow up  10. BMI 39.68  11. Continue post partum care. Patient desires discharge today. Counseling Completed:  Secondary Smoke risks and Sudden Infant Death Syndrome were reviewed with recommendations. Infant sleeping, \"back to sleep\" and avoidance of co-sleeping recommendations were reviewed. Signs and Symptoms of Post Partum Depression were reviewed. The patient is to call if any occur. Signs and symptoms of Mastitis were reviewed. The patient is to call if any occur for follow up. Discharge instructions including pelvic rest, no driving with pain medicine and office follow-up were reviewed with patient     Attending Physician: Dr. Allie Corey,   Ob/Gyn Resident   5/15/2021, 4:06 AM   C. Earl Dubin

## 2021-05-15 NOTE — PROGRESS NOTES
Labor Progress Note    Gwendolyn Hamilton is a 31 y.o. female [id number] at 39w1d  The patient was seen and examined. Her pain is well controlled. Patient's epidural hadn't been providing adequate relief, anesthesia re-evaluated, patient now more comfortable. She reports fetal movement is present, complains of contractions, complains of loss of fluid, denies vaginal bleeding. Prolonged deceleration noted at 2057 after epidural redosed, resolved with position change, IVF bolus. SVE performed at that time. Some early decelerations noted.        Vital Signs:  Vitals:    05/14/21 1906 05/14/21 1913 05/14/21 1943 05/14/21 2014   BP: 119/66 (!) 105/55 (!) 102/59 (!) 108/56   Pulse: 75 71 79 79   Resp:  16     Temp:  97.7 °F (36.5 °C)     TempSrc:  Infrared     SpO2:       Weight:       Height:            FHT: 120, moderate variability, accelerations present, some early decelerations noted and prolonged deceleration noted as documented above  Contractions: regular, every 2-6 minutes    Chaperone for Intimate Exam: Chaperone was present for entire exam, Chaperone Name: PAMELA Gutierrez  Cervical Exam: 7-8 cm dilated, 90 effaced, 0 station  Pitocin: @ 6 mu/min    Membranes: Ruptured clear fluid  Scalp Electrode in place: present  Intrauterine Pressure Catheter in Place: absent    Interventions: SVE, intrauterine resuscitation after prolonged deceleration    Assessment/Plan:  Gwendolyn Hamilton is a 31 y.o. female [id number] at 39w1d admitted for IOL 2/2 GDMA1              - GBS positive, Pen G for GBS prophylaxis              - VSS, afebrile              - cEFM/TOCO              - S/p cytotec 25mcg PV x3              - AROM clear fluid 1635              - FSE in place              - Continue pitocin per protocol with intrauterine resuscitation as needed              - Epidural redosed by anesthesia, patient now more comfortable     GDMA1              - Currently NPO              - Will check POCT q4h    Attending updated and in agreement with plan    Miki Mcwilliams DO  Ob/Gyn Resident  5/14/2021, 9:13 PM

## 2021-05-15 NOTE — FLOWSHEET NOTE
2032 Anesthesia notified of request for epidural.  2038 -  Dr. Zoey Clarke at bedside and consent form signed. Everyone in room is wearing a mask. Risks and benefits discussed and patient verbalizes understanding. Patient verbalizes to proceed with procedure. Time out performed. 2038 - to dangle position. 2043  - procedure started. 2049  - test dose given. Patient tolerated procedure well. 2053 -to low fowlers position with hip wedge in place. 2055 - epidural pump started. See anesthesia note.

## 2021-05-15 NOTE — LACTATION NOTE
This note was copied from a baby's chart. Education information given to mother and she verbalizes understanding about the following:  Understanding your baby's  screening tests pamphlet. Hour for [employer]. Patient Safety Education. Infant security including the four band system and the HUGS system. Skin to Skin Contact for You and Your Baby. Benefits of breastfeeding. QR codes for videos online including: Breastfeeding Massage/Hand Express, Breastfeeding Positions, and Breastfeeding latch. Risks of formula given and discussed with mother. What do the experts say about the use of pacifiers/supplementation of a  infant? Safe sleep for your baby (supplied by [address])    Mother encouraged to review pamphlets and watch videos (if able). Mother chooses to breastfeed.

## 2021-05-15 NOTE — L&D DELIVERY NOTE
Mother's Information    Labor Events    Rupture type: Artificial=AROM, Intact  Fluid color: Clear  Fluid odor: None     Mother Delivery Information    Episiotomy: None  Lacerations: None  Repair Suture: None  Surgical or Additional Est. Blood Loss (mL): 0 (View Only): Edit in Flowsheets   Combined Est. Blood Loss (mL): 0        Pocs, Baby Pending [id number] [[id number]]    Labor Events    Cervical ripening date/time:     Rupture date/time:     Rupture type: Artificial=AROM, Intact  Fluid color: Clear  Fluid odor: None          Anesthesia    Method: Epidural     Assisted Delivery Details    Forceps attempted?: No  Vacuum extractor attempted?: No     Document Additional Attempt       Document Additional Attempt             Shoulder Dystocia    Shoulder dystocia present?: No  Add Second Maneuver  Add Third Maneuver  Add Fourth Maneuver  Add Fifth Maneuver  Add Sixth Maneuver  Add Seventh Maneuver  Add Eighth Maneuver  Add Ninth Maneuver     [place] Information    Head delivery date/time: 2021 21:42:00   Changing the 's delivery date/time could affect patient care.:    Delivery date/time:  21   Delivery type: Vaginal, Spontaneous    Details:        Delivery Providers    Delivering clinician: Norma Gasca MD   Provider Role    DO Xin Montemayor RN Kermit Horde, PAMELA Daly       Placenta    Date/time: 2021 21:45:00  Removal: Spontaneous  Appearance: Intact  Disposition: Lab     Delivery Resuscitation    Method: Bulb Suction, Stimulation     Skin to Skin    Skin to skin initiation date/time: 21 21:42:00   Skin to skin with:  Mother  Skin to skin end date/time:         Measurements       Delivery Information    Episiotomy: None  Perineal lacerations: None    Vaginal laceration: No    Cervical laceration: No    Surgical or additional est. blood loss (mL): 0 (View Only): Edit in Flowsheets   Combined est. blood loss (mL): 0  Repair suture: None     Other Procedures    Procedures: None          Vaginal Delivery Note  Department of Obstetrics and Gynecology  [facility]       Patient: Alvaro Lund   : 1990  MRN: [id number]   Date of delivery: 21     Pre-operative Diagnosis: Kaylynn Vigil Pocs [id number] at 39w1d, IUP     Rh+/RI/GBS+    IOL 2/2 GDMA1     Hypothyroidism (no meds)    Hx Pulm HTN  (fetal echo wnl-cardio says no)    Hx Lap Choley     Fatty liver    Hx PP depression    BMI 39.68    Post-operative Diagnosis:  Living  male infant, same as above    Delivering Obstetrician & Assistant(s): Dr. Saturnino Landers; Carrie Martinez, PGY2    Infant Information:   Information for the patient's :  [address] [[id number]]        Information for the patient's :  Pocs, Baby Pending Laureen [[id number]]        Weight: 8lb 2oz  Apgar scores: 9 at 1 minute and 9 at 5 minutes. Anesthesia:  epidural anesthesia    Application and Delivery:    She was known to be GBS positive and received antibiotic prophylaxis. The patient progressed well, received an epidural, became complete and felt the urge to push. After pushing with contractions the fetal head delivered Cephalic, right occiput anterior over an intact perineum, nuchal cord was not present. The anterior, then posterior shoulder delivered easily and atraumatically followed by the rest of the infant. Nose and mouth suctioned with bulb suction, infant was stimulated and dried. Cord was clamped and cut after one minute delayed cord clamping and infant was placed on maternal abdomen, and attended by RN for evaluation. The delivery of the placenta was spontaneous and appeared intact and that the umbilical cord had three vessels noted. Pitocin was started. Due to multiple risk factors for postpartum hemorrhage, patient received 1 dose of IM methergine immediately after delivery of placenta. The vagina was swept of all clots and debris.  The perineum and vagina were evaluated and no laceration was found. Mother and baby tolerated procedure well. Dr. Tom Dorman was present for entire delivery.     Delivery Summary:       Specimen: placenta sent to pathology, cord blood and cord gases  Quantitative blood loss:  50ml immediately following delivery  Condition:  infant stable to general nursery and mother stable  Counts: instrument and sponge counts correct  Blood Type and Rh: A POSITIVE    Rubella Immunity Status: immune  Infant Feeding: breast feeding    Hubert Powers DO  Ob/Gyn Resident  5/14/2021, 10:07 PM

## 2021-05-15 NOTE — PROGRESS NOTES
Labor Progress Note    Girish Banda is a 31 y.o. female [id number] at 39w1d  The patient was seen and examined. Her pain is well controlled and she is feeling more pressure. She reports fetal movement is present, complains of contractions, complains of loss of fluid, denies vaginal bleeding.        Vital Signs:  Vitals:    05/14/21 1843 05/14/21 1906 05/14/21 1913 05/14/21 1943   BP: 110/62 119/66 (!) 105/55 (!) 102/59   Pulse: 77 75 71 79   Resp: 14  16    Temp: 97.5 °F (36.4 °C)  97.7 °F (36.5 °C)    TempSrc: Infrared  Infrared    SpO2:       Weight:       Height:            FHT: 130, moderate variability, accelerations present, decelerations absent  Contractions: irregular, every 3-6 minutes    Chaperone for Intimate Exam: Chaperone was present for entire exam, Chaperone Name: PAMELA Gutierrez  Cervical Exam: 5 cm dilated, 80 effaced, -1 station  Pitocin: @ 6 mu/min    Membranes: Ruptured clear fluid  Scalp Electrode in place: present  Intrauterine Pressure Catheter in Place: absent    Interventions: SVE performed    Assessment/Plan:  Girish Banda is a 31 y.o. female [id number] at 39w1d admitted for IOL 2/2 GDMA1              - GBS positive, Pen G for GBS prophylaxis              - VSS, afebrile              - cEFM/TOCO              - S/p cytotec 25mcg PV x3              - AROM clear fluid 1635   - FSE in place   - Continue pitocin per protocol              - Epidural in place     GDMA1              - Currently NPO              - Will check POCT q4h    Attending updated and in agreement with plan    Ashlie García DO  Ob/Gyn Resident  5/14/2021, 8:01 PM

## 2021-05-16 VITALS
BODY MASS INDEX: 39.65 KG/M2 | RESPIRATION RATE: 16 BRPM | OXYGEN SATURATION: 99 % | HEART RATE: 72 BPM | WEIGHT: 210 LBS | DIASTOLIC BLOOD PRESSURE: 62 MMHG | TEMPERATURE: 97.5 F | HEIGHT: 61 IN | SYSTOLIC BLOOD PRESSURE: 110 MMHG

## 2021-05-16 PROBLEM — O09.90 HRP (HIGH RISK PREGNANCY): Status: RESOLVED | Noted: 2021-05-15 | Resolved: 2021-05-16

## 2021-05-16 PROCEDURE — 99024 POSTOP FOLLOW-UP VISIT: CPT | Performed by: SPECIALIST

## 2021-05-16 ASSESSMENT — PAIN SCALES - GENERAL: PAINLEVEL_OUTOF10: 0

## 2021-05-16 NOTE — LACTATION NOTE
This note was copied from a baby's chart. Infant feeding plans discussed with mother. Mother taught to recognize the cues that indicate when her infants is hungry and when they are full. Mother encouraged to feed her infant on demand allowing baby to feed as often and for as long as the infant wants to and discussed that most babies will feed at least 8 times in 24 hrs. Patients instructed it is is best for breastfeeding  success to avoid bottles and pacifiers unless medically indicated until breastfeeding is fully established( approximately 3-4 weeks) reviewed handout \"What do the experts say about the use of pacifiers/supplementation of a  infant? \" with patient. Discussed breastfeeding information see education. (see Education Tab)    Baby did  use pacifier during hospital stay. Formula feeding/ formula supplementation plan. Formula preparation handout given and reviewed with parents with demonstration of Formula preparation according to CDC Guidelines. Formula preparation video offered/refused. Questions answered.

## 2021-05-16 NOTE — PLAN OF CARE
Problem: Pain:  Goal: Control of acute pain  Description: Control of acute pain  5/16/2021 0444 by Jarek Cannon RN  Outcome: Ongoing  5/16/2021 0443 by Jarek Cannon RN  Outcome: Ongoing  5/15/2021 1741 by Kassandra Worley RN  Outcome: Ongoing     Problem: Fluid Volume - Imbalance:  Goal: Absence of imbalanced fluid volume signs and symptoms  Description: Absence of imbalanced fluid volume signs and symptoms  5/16/2021 0444 by Jarek Cannon RN  Outcome: Ongoing  5/16/2021 0443 by Jarek Cannon RN  Outcome: Ongoing  Goal: Absence of postpartum hemorrhage signs and symptoms  Description: Absence of postpartum hemorrhage signs and symptoms  5/16/2021 0444 by Jarek Cannon RN  Outcome: Ongoing  5/16/2021 0443 by Jarek Cannon RN  Outcome: Ongoing     Problem: Pain - Acute:  Goal: Pain level will decrease  Description: Pain level will decrease  5/16/2021 0444 by Jarek Cannon RN  Outcome: Ongoing  5/16/2021 0443 by Jarek Cannon RN  Outcome: Ongoing     Problem: Discharge Planning:  Goal: Discharged to appropriate level of care  Description: Discharged to appropriate level of care  5/16/2021 0444 by Jarek Cannon RN  Outcome: Ongoing  5/16/2021 0443 by Jarek Cannon RN  Outcome: Ongoing     Problem: Constipation:  Goal: Bowel elimination is within specified parameters  Description: Bowel elimination is within specified parameters  5/16/2021 0444 by Jarek Cannon RN  Outcome: Ongoing  5/16/2021 0443 by Jarek Cannon RN  Outcome: Ongoing     Problem: Infection - Risk of, Puerperal Infection:  Goal: Will show no infection signs and symptoms  Description: Will show no infection signs and symptoms  5/16/2021 0444 by Jarek Cannon RN  Outcome: Ongoing  5/16/2021 0443 by Jarek Cannon RN  Outcome: Ongoing     Problem: Mood - Altered:  Goal: Mood stable  Description: Mood stable  5/16/2021 0444 by Jarek Cannon RN  Outcome: Ongoing  5/16/2021 0443 by Michael Patel RN  Outcome: Ongoing

## 2021-05-16 NOTE — FLOWSHEET NOTE
Discharge instructions given per order, patient signs and states understanding. Copies give along with Rx      Patient wheeled to door for discharge with significant other and infant.

## 2021-05-16 NOTE — PROGRESS NOTES
POST PARTUM DAY # 2    Freddie Rivas is a 31 y.o. female  This patient was seen & examined. Today she is doing well without any chief complaint. Her pain is controlled with Motrin/Tylenol. Her lochia is light. She denies chest pain, shortness of breath, headache and blurred vision. She is  breast feeding and she denies any breast tenderness. She is ambulating well. Her voiding pattern is normal. I reviewed signs and symptoms of post partum depression with the patient, she currently denies any of these symptoms. She is tolerating solids.      Her pregnancy was complicated by:   Patient Active Problem List   Diagnosis    Pelvic pain     High-risk pregnancy in third trimester    Hx of postpartum depression, currently pregnant    History of Thyroid Disorder (G3)    History of pulmonary hypertension (G3)    Fatty liver    Cyst of right ovary    ADHD    Diet controlled gestational diabetes mellitus (GDM) in third trimester    Rh+/RI/GBS+     21 M APG 9/9 Wt 8#2    HRP (high risk pregnancy)         Vital Signs:  Vitals:    05/15/21 0312 05/15/21 1122 05/15/21 1400 05/15/21 1949   BP: (!) 109/59 (!) 102/59 108/62 (!) 102/59   Pulse: 71 65 68 66   Resp:  14 16 16   Temp:  97.2 °F (36.2 °C) 97.5 °F (36.4 °C) 97.9 °F (36.6 °C)   TempSrc:  Infrared Infrared Infrared   SpO2:  99%     Weight:       Height:             Physical Exam:  General:  awake, alert, cooperative, no apparent distress, and appears stated age  Neurologic:  alert, oriented, normal speech, no focal findings or movement disorder noted  Lungs:  No increased work of breathing, good air exchange, clear to auscultation bilaterally, no crackles or wheezing  Heart:  Normal apical impulse, regular rate and rhythm, normal S1 and S2, no S3 or S4, and no murmur noted    Abdomen: Soft, nontender, nondistended, bowel sounds present, no rebound, rigidity or guarding  Fundus: non-tender, firm, below umbilicus  Extremities:  no calf tenderness, non edematous    Lab:  Lab Results   Component Value Date    HGB 12.0 05/15/2021     Lab Results   Component Value Date    HCT 35.5 (L) 05/15/2021       Assessment/Plan:  1Sadi Maher is a [id number] PPD # 2 s/p    - Doing well, VSS   - male infant in [address], circumcision desired, consent obtained   - Encourage ambulation   - Postpartum Hgb/Hct 12.0   - S/p Methergine x1 immediately post-delivery   - Motrin/Tylenol PRN pain   - Continue postpartum care    - Awaiting SW consult prior to discharge today   2. Rh positive/Rubella immune  - Rhogam/MMR not indicated   3. Breast feeding   - Denies s/sx mastitis  4. GDMA1  - Fasting blood sugar on PPD#1 72  - Patient will need 2-hr GTT at 6 week postpartum visit  5. Headache  - Occurring on PPD #1  - Given Fioricet and mag oxide PRN  - Denied other s/sx PreE   - BP normotensive   - Significantly improved today  6. Hypothyroidism  - Controlled on no medications   - Denies sx hypo- or hyperthyroidism  - F/U outpatient   7. Hx PP Depression  - Currently denies SI/HI, mood changes  - SW consulted PP   8. BMI 39.7      Counseling Completed:  Secondary Smoke risks and Sudden Infant Death Syndrome were reviewed with recommendations. Infant sleeping, \"back to sleep\" and avoidance of co-sleeping recommendations were reviewed. Signs and Symptoms of Post Partum Depression were reviewed. The patient is to call if any occur. Signs and symptoms of Mastitis were reviewed. The patient is to call if any occur for follow up. Discharge instructions including pelvic rest, no driving with pain medicine and office follow-up were reviewed with patient     Attending Physician: Dr. Janna Garcia, DO  Ob/Gyn Resident   2021, 6:44 AM  I agree to the above documentation placed by my scribe Caprice Ceballos. I reviewed the scribe's note and agree with the documented findings and plan of care. Any areas of disagreement are noted on the chart.    I have personally evaluated this patient. Additional findings are as noted. I agree with the chief complaint, past medical history, past surgical history, allergies, medications, social and family history as documented unless otherwise noted below.      Electronically signed by Jazzy Anderson MD on 5/16/2021 at 7:49 AM

## 2021-05-19 LAB — SURGICAL PATHOLOGY REPORT: NORMAL

## 2021-05-27 ENCOUNTER — POSTPARTUM VISIT (OUTPATIENT)
Dept: OBGYN CLINIC | Age: 31
End: 2021-05-27

## 2021-05-27 VITALS
DIASTOLIC BLOOD PRESSURE: 75 MMHG | HEART RATE: 94 BPM | WEIGHT: 187 LBS | SYSTOLIC BLOOD PRESSURE: 119 MMHG | BODY MASS INDEX: 35.33 KG/M2

## 2021-05-27 DIAGNOSIS — N94.6 DYSMENORRHEA: ICD-10-CM

## 2021-05-27 DIAGNOSIS — Z30.09 BIRTH CONTROL COUNSELING: ICD-10-CM

## 2021-05-27 DIAGNOSIS — N92.6 IRREGULAR MENSES: ICD-10-CM

## 2021-05-27 PROCEDURE — 0503F POSTPARTUM CARE VISIT: CPT | Performed by: CLINICAL NURSE SPECIALIST

## 2021-05-27 SDOH — ECONOMIC STABILITY: TRANSPORTATION INSECURITY
IN THE PAST 12 MONTHS, HAS LACK OF TRANSPORTATION KEPT YOU FROM MEETINGS, WORK, OR FROM GETTING THINGS NEEDED FOR DAILY LIVING?: NO

## 2021-05-27 SDOH — ECONOMIC STABILITY: TRANSPORTATION INSECURITY
IN THE PAST 12 MONTHS, HAS THE LACK OF TRANSPORTATION KEPT YOU FROM MEDICAL APPOINTMENTS OR FROM GETTING MEDICATIONS?: NO

## 2021-05-27 SDOH — ECONOMIC STABILITY: FOOD INSECURITY: WITHIN THE PAST 12 MONTHS, YOU WORRIED THAT YOUR FOOD WOULD RUN OUT BEFORE YOU GOT MONEY TO BUY MORE.: NEVER TRUE

## 2021-05-27 ASSESSMENT — ENCOUNTER SYMPTOMS
EYES NEGATIVE: 1
GASTROINTESTINAL NEGATIVE: 1
ALLERGIC/IMMUNOLOGIC NEGATIVE: 1
RESPIRATORY NEGATIVE: 1

## 2021-05-27 ASSESSMENT — SOCIAL DETERMINANTS OF HEALTH (SDOH): HOW HARD IS IT FOR YOU TO PAY FOR THE VERY BASICS LIKE FOOD, HOUSING, MEDICAL CARE, AND HEATING?: NOT HARD AT ALL

## 2021-05-27 NOTE — PROGRESS NOTES
Postpartum Visit: Patient is here today for postpartum vaginal delivery. I have fully reviewed the prenatal and intrapartum course. She is 2 weeks postpartum. Patient is breast and bottle feeding. Her bleeding is light. Patient's pain is 2 out of 10 on the pain scale. Patient is not sexually active. Current contraception method is none. Postpartum depression screening questionnaire provided to patient and is negative.

## 2021-05-27 NOTE — PROGRESS NOTES
BUCKY Rivas is a 31 y.o. female  presents for her 2 week postpartum vaginal delivery visit. Patient reports that she is having regular bowel movements, and is urinating without difficulty. Patient states that her bleeding is moderate to light. Patient is breast and bottle feeding. Patient denies any mastitis. Patient denies any postpartum depression/baby blues, no suicidal, or homicidal thoughts, and has good support system. Patient denies any pain at this time. [id number]    OB History    Para Term  AB Living   7 5 5   2 5   SAB TAB Ectopic Molar Multiple Live Births   2       0 5      # Outcome Date GA Lbr Sylvester/2nd Weight Sex Delivery Anes PTL Lv   7 Term 21 39w1d 00:27 / 00:11 8 lb 1.8 oz (3.68 kg) M Vag-Spont EPI N SHENG   6 2019           5 Term 14 39w0d  9 lb 13 oz (4.451 kg) M Vag-Spont EPI N SHENG   4 Term 13 38w0d  7 lb 6 oz (3.345 kg) F Vag-Spont EPI N SHENG      Complications: Pulmonary hypertension (HCC)   3 Term 01/05/10 39w0d  6 lb 14 oz (3.118 kg) F Vag-Spont EPI N SHNEG   2 2009           1 Term 10/18/07 39w0d  8 lb 7 oz (3.827 kg) F Vag-Spont EPI N SHENG       Blood pressure 119/75, pulse 94, weight 187 lb (84.8 kg), currently breastfeeding. Patient Active Problem List    Diagnosis Date Noted     21 M APG 9/9 Wt 8#2 2021    Rh+/RI/GBS+ 2021    Hx GDMA1 (G7) 2021    ADHD 2021     The patient was on Concerta      Fatty liver 2018     She had elevated LFTs that have normalized. She followed with GI. Work up for Intrinsic hepatic disease was negative. Severe hepatic steatosis seen on Ct on 2018      Cyst of right ovary 2018    History of Thyroid Disorder (G3) 2014     Per patient she was diagnosed with hyperthyroidism in third pregnancy and was on medications. No issues outside of pregnancy. Not currently on meds.          History of pulmonary hypertension (G3) 2014     Echo 20: EF 55%, RV pressure 32 mmg indicated mild pulm HTN     History of Pulmonary HTN in prior pregnancy- normal ECHO in 2014 per patient      Pelvic pain  07/15/2014     Musculoskeletal. Pubic Symphysis strain  Flexeril and pregnancy support belt given 07/15/2014        Hx of postpartum depression, currently pregnant 07/15/2014        Diagnosis Orders   1. Postpartum care following vaginal delivery     2. Birth control counseling     3. Irregular menses     4. Dysmenorrhea         Vitals:    05/27/21 1517   BP: 119/75   Pulse: 94            Review of Systems   Constitutional: Negative for chills and fever. HENT: Negative. Eyes: Negative. Respiratory: Negative. Cardiovascular: Negative. Gastrointestinal: Negative. Endocrine: Negative. Genitourinary: Positive for vaginal bleeding (light to moderate). Negative for dysuria and menstrual problem. Musculoskeletal: Negative. Skin: Negative. Allergic/Immunologic: Negative. Neurological: Negative. Hematological: Negative. Psychiatric/Behavioral: Negative. Physical Exam  Vitals reviewed. Constitutional:       Appearance: She is well-developed. HENT:      Head: Normocephalic and atraumatic. Cardiovascular:      Rate and Rhythm: Normal rate and regular rhythm. Pulmonary:      Effort: Pulmonary effort is normal.      Breath sounds: Normal breath sounds. Abdominal:      General: Bowel sounds are normal.   Musculoskeletal:         General: Normal range of motion. Cervical back: Normal range of motion and neck supple. Skin:     General: Skin is warm and dry. Neurological:      Mental Status: She is oriented to person, place, and time. Psychiatric:         Behavior: Behavior normal.         Thought Content: Thought content normal.         Judgment: Judgment normal.          Assessment         Diagnosis Orders   1. Postpartum care following vaginal delivery     2. Birth control counseling     3. Irregular menses     4. Dysmenorrhea         Plan      Discussed with patient birth control and patient states that she would like birth control pills. Discussed with patient using slynd progesterone only due to her breast feeding and she verbalized understanding  Reviewed birth control pill risks, side effects and use and patient verbalized understanding. Patient will come in to office in approx. 1-2 wks for preg test and will give sample of slynd birth control if negative preg test.  RTW slip given for 6/7/21    Follow up in 4 wks for 6 wk PP visit.     Electronically signed by: Carlos Jose CNP

## 2021-06-03 ENCOUNTER — NURSE ONLY (OUTPATIENT)
Dept: OBGYN CLINIC | Age: 31
End: 2021-06-03
Payer: COMMERCIAL

## 2021-06-03 DIAGNOSIS — N94.6 DYSMENORRHEA: Primary | ICD-10-CM

## 2021-06-03 LAB
CONTROL: NORMAL
PREGNANCY TEST URINE, POC: NORMAL

## 2021-06-03 PROCEDURE — 81025 URINE PREGNANCY TEST: CPT | Performed by: SPECIALIST

## 2021-07-15 ENCOUNTER — TELEPHONE (OUTPATIENT)
Dept: OBGYN CLINIC | Age: 31
End: 2021-07-15

## 2021-07-15 RX ORDER — DROSPIRENONE 4 MG/1
1 TABLET, FILM COATED ORAL DAILY
Qty: 28 TABLET | Refills: 1 | Status: SHIPPED | OUTPATIENT
Start: 2021-07-15 | End: 2022-02-24 | Stop reason: SDUPTHER

## 2021-07-15 NOTE — TELEPHONE ENCOUNTER
Pt called in to schedule appt however she will be out of her bc before her appt on 07/27/2021. Pt requesting a rx be sent to her pharmacy. She was given samples at last appt of [facility].      Thanks

## 2021-07-27 ENCOUNTER — OFFICE VISIT (OUTPATIENT)
Dept: OBGYN CLINIC | Age: 31
End: 2021-07-27
Payer: COMMERCIAL

## 2021-07-27 VITALS
WEIGHT: 187 LBS | HEART RATE: 80 BPM | SYSTOLIC BLOOD PRESSURE: 116 MMHG | DIASTOLIC BLOOD PRESSURE: 67 MMHG | BODY MASS INDEX: 35.33 KG/M2

## 2021-07-27 DIAGNOSIS — N92.1 BREAKTHROUGH BLEEDING ON BIRTH CONTROL PILLS: Primary | ICD-10-CM

## 2021-07-27 DIAGNOSIS — Z30.41 ENCOUNTER FOR BIRTH CONTROL PILLS MAINTENANCE: ICD-10-CM

## 2021-07-27 PROCEDURE — 99213 OFFICE O/P EST LOW 20 MIN: CPT | Performed by: CLINICAL NURSE SPECIALIST

## 2021-07-27 RX ORDER — DROSPIRENONE 4 MG/1
1 TABLET, FILM COATED ORAL DAILY
Qty: 28 TABLET | Refills: 8 | Status: SHIPPED | OUTPATIENT
Start: 2021-07-27 | End: 2022-02-24 | Stop reason: SDUPTHER

## 2021-07-27 ASSESSMENT — ENCOUNTER SYMPTOMS
RESPIRATORY NEGATIVE: 1
ALLERGIC/IMMUNOLOGIC NEGATIVE: 1
GASTROINTESTINAL NEGATIVE: 1
EYES NEGATIVE: 1

## 2021-07-27 NOTE — PROGRESS NOTES
Subjective:      Patient ID:  Quin Metzger is a 27 y.o. female who presents for   Chief Complaint   Patient presents with    Medication Check       HPI  Patient is a 26 yo female who presents for discuss birth control slynd. Patient reports that she is having breakthrough bleeding with slynd. She reports that the closer she gets to taking the pill the more she spots and once she takes it the bleeding stops till the next day. Patient reports that she has had breakthrough bleeding with every form of birth control she has tried. Review of Systems   Constitutional: Negative for chills and fever. HENT: Negative. Eyes: Negative. Respiratory: Negative. Cardiovascular: Negative. Gastrointestinal: Negative. Endocrine: Negative. Genitourinary: Positive for vaginal bleeding (breakthrough bleeding on birth control pills). Negative for dysuria and menstrual problem. Musculoskeletal: Negative. Skin: Negative. Allergic/Immunologic: Negative. Neurological: Negative. Hematological: Negative. Psychiatric/Behavioral: Negative. /67 (Site: Right Upper Arm, Position: Sitting)   Pulse 80   Wt 187 lb (84.8 kg)   LMP 07/27/2021 (Approximate)   BMI 35.33 kg/m²    Patient's last menstrual period was 07/27/2021 (approximate). History reviewed. No pertinent family history.    Past Medical History:   Diagnosis Date    ADHD 01/04/2021    Anemia     Anemia of pregnancy- started on Iron    Complication of anesthesia     2010 with epidural she has numbness on only one side, but has had 2 other epidurals without incidence    Fatty liver 06/06/2018    [facility] ([facility] 03/25/2021    History of Thyroid Disorder 09/03/2014    hyperthyroidism with 3rd pregnancy but has resolved    Ovarian cyst     Postpartum depression     2010 and was on meds for short term      Past Surgical History:   Procedure Laterality Date    CHOLECYSTECTOMY  2011      Social History     Socioeconomic History    Marital status:      Spouse name: None    Number of children: None    Years of education: None    Highest education level: None   Occupational History    None   Tobacco Use    Smoking status: Former Smoker     Quit date: 10/18/2020     Years since quittin.7    Smokeless tobacco: Never Used   Vaping Use    Vaping Use: Never used   Substance and Sexual Activity    Alcohol use: Not Currently     Alcohol/week: 0.0 standard drinks     Comment: occasional    Drug use: No    Sexual activity: Yes     Partners: Male   Other Topics Concern    None   Social History Narrative    None     Social Determinants of Health     Financial Resource Strain: Low Risk     Difficulty of Paying Living Expenses: Not hard at all   Food Insecurity: No Food Insecurity    Worried About Running Out of Food in the Last Year: Never true    Kain of Food in the Last Year: Never true   Transportation Needs: No Transportation Needs    Lack of Transportation (Medical): No    Lack of Transportation (Non-Medical):  No   Physical Activity:     Days of Exercise per Week:     Minutes of Exercise per Session:    Stress:     Feeling of Stress :    Social Connections:     Frequency of Communication with Friends and Family:     Frequency of Social Gatherings with Friends and Family:     Attends Hindu Services:     Active Member of Clubs or Organizations:     Attends Club or Organization Meetings:     Marital Status:    Intimate Partner Violence:     Fear of Current or Ex-Partner:     Emotionally Abused:     Physically Abused:     Sexually Abused:       Current Outpatient Medications   Medication Sig Dispense Refill    Drospirenone (SLYND) 4 MG TABS Take 1 Package by mouth daily 28 tablet 8    Drospirenone (SLYND) 4 MG TABS Take 1 tablet by mouth daily 28 tablet 1    Prenatal Vit-Fe Fumarate-FA (PNV PRENATAL PLUS MULTIVITAMIN) 27-1 MG TABS Take 1 tablet by mouth daily 30 tablet 11    promethazine (PHENERGAN) 25 MG tablet Take 25 mg by mouth every 6 hours as needed for Nausea (Patient not taking: Reported on 7/27/2021)       No current facility-administered medications for this visit. Objective:   Physical Exam  Vitals reviewed. Constitutional:       Appearance: She is well-developed. HENT:      Head: Normocephalic and atraumatic. Eyes:      Conjunctiva/sclera: Conjunctivae normal.   Cardiovascular:      Rate and Rhythm: Normal rate and regular rhythm. Pulmonary:      Effort: Pulmonary effort is normal.      Breath sounds: Normal breath sounds. Musculoskeletal:         General: Normal range of motion. Cervical back: Normal range of motion and neck supple. Skin:     General: Skin is warm and dry. Neurological:      Mental Status: She is oriented to person, place, and time. Psychiatric:         Behavior: Behavior normal.         Thought Content: Thought content normal.         Judgment: Judgment normal.         Assessment:      Diagnosis Orders   1. Breakthrough bleeding on birth control pills     2. Encounter for birth control pills maintenance  Drospirenone (SLYND) 4 MG TABS           Plan:    Discussed other birth control options and patient would like to continue with slynd for a little longer to see if the spotting stops. Patient instructed if spotting continues and she would like to change birth control methods to schedule an appt. And patient verbalized understanding. Return for as needed. Patient was seen with total face to face time of 20 minutes. More than 50% of this visit was on counseling andeducation regarding the problems listed below and her options. She was also counseled on her preventative health maintenance recommendations and follow-up.     Electronically signed by: Koko Parker CNP

## 2021-09-15 ENCOUNTER — TELEPHONE (OUTPATIENT)
Dept: OBGYN CLINIC | Age: 31
End: 2021-09-15

## 2021-09-15 RX ORDER — FLUCONAZOLE 100 MG/1
100 TABLET ORAL DAILY
Qty: 7 TABLET | Refills: 0 | Status: SHIPPED | OUTPATIENT
Start: 2021-09-15 | End: 2021-09-22

## 2021-09-15 RX ORDER — METRONIDAZOLE 500 MG/1
500 TABLET ORAL 2 TIMES DAILY
Qty: 14 TABLET | Refills: 0 | Status: SHIPPED | OUTPATIENT
Start: 2021-09-15 | End: 2021-09-16

## 2021-09-15 NOTE — TELEPHONE ENCOUNTER
Patient has a vaginal discharge with odor x 4 days. No other symptoms. Patient uses the [employer] in [email or web address].   Thanks

## 2021-09-16 RX ORDER — METRONIDAZOLE 7.5 MG/G
GEL VAGINAL
Qty: 1 EACH | Refills: 0 | Status: SHIPPED | OUTPATIENT
Start: 2021-09-16

## 2021-09-27 ENCOUNTER — PATIENT MESSAGE (OUTPATIENT)
Dept: OBGYN CLINIC | Age: 31
End: 2021-09-27

## 2021-09-27 RX ORDER — NITROFURANTOIN 25; 75 MG/1; MG/1
100 CAPSULE ORAL 2 TIMES DAILY
Qty: 14 CAPSULE | Refills: 0 | Status: SHIPPED | OUTPATIENT
Start: 2021-09-27 | End: 2021-10-04

## 2021-11-02 ENCOUNTER — PATIENT MESSAGE (OUTPATIENT)
Dept: OBGYN CLINIC | Age: 31
End: 2021-11-02

## 2021-11-02 NOTE — TELEPHONE ENCOUNTER
From: Laureen Jean  To: KADEEM Peña CNP  Sent: 11/2/2021 2:44 PM EDT  Subject: Prescription Question    Ortiz Inks I feel like I've tried [facility] for a while now and I am still bleeding the entire month. Can we change it to something else? I had taken lo estrin fe before it worked for a while then I started with break through bleeding. Any suggestions?

## 2021-11-30 ENCOUNTER — PATIENT MESSAGE (OUTPATIENT)
Dept: OBGYN CLINIC | Age: 31
End: 2021-11-30

## 2021-11-30 RX ORDER — DROSPIRENONE 4 MG/1
1 TABLET, FILM COATED ORAL DAILY
Qty: 28 TABLET | Refills: 3 | Status: SHIPPED | OUTPATIENT
Start: 2021-11-30 | End: 2022-02-24 | Stop reason: SDUPTHER

## 2022-02-24 ENCOUNTER — PATIENT MESSAGE (OUTPATIENT)
Dept: OBGYN CLINIC | Age: 32
End: 2022-02-24

## 2022-02-24 RX ORDER — DROSPIRENONE 4 MG/1
1 TABLET, FILM COATED ORAL DAILY
Qty: 84 TABLET | Refills: 1 | Status: SHIPPED | OUTPATIENT
Start: 2022-02-24 | End: 2022-08-17

## 2022-02-24 NOTE — TELEPHONE ENCOUNTER
From: Laureen Jean  To: Clinton Martinez  Sent: 2/24/2022 11:03 AM EST  Subject: 90 day supply     Can I get a prescription of slynd for 90 days sent to [facility] in [place] please, my copay is 90$ a month this year.

## 2022-08-17 RX ORDER — DROSPIRENONE 4 MG/1
1 TABLET, FILM COATED ORAL DAILY
Qty: 28 TABLET | Refills: 0 | Status: SHIPPED | OUTPATIENT
Start: 2022-08-17 | End: 2022-09-14 | Stop reason: SDUPTHER

## 2022-09-14 ENCOUNTER — HOSPITAL ENCOUNTER (OUTPATIENT)
Age: 32
Setting detail: SPECIMEN
Discharge: HOME | End: 2022-09-14

## 2022-09-14 ENCOUNTER — OFFICE VISIT (OUTPATIENT)
Dept: OBGYN CLINIC | Age: 32
End: 2022-09-14
Payer: COMMERCIAL

## 2022-09-14 VITALS
BODY MASS INDEX: 35.19 KG/M2 | HEART RATE: 84 BPM | WEIGHT: 186.4 LBS | DIASTOLIC BLOOD PRESSURE: 80 MMHG | SYSTOLIC BLOOD PRESSURE: 136 MMHG | HEIGHT: 61 IN

## 2022-09-14 DIAGNOSIS — R39.9 UTI SYMPTOMS: ICD-10-CM

## 2022-09-14 DIAGNOSIS — Z01.419 WELL WOMAN EXAM WITH ROUTINE GYNECOLOGICAL EXAM: Primary | ICD-10-CM

## 2022-09-14 DIAGNOSIS — N94.6 DYSMENORRHEA: ICD-10-CM

## 2022-09-14 DIAGNOSIS — Z30.41 ENCOUNTER FOR BIRTH CONTROL PILLS MAINTENANCE: ICD-10-CM

## 2022-09-14 PROCEDURE — 99395 PREV VISIT EST AGE 18-39: CPT | Performed by: SPECIALIST

## 2022-09-14 PROCEDURE — 81003 URINALYSIS AUTO W/O SCOPE: CPT | Performed by: SPECIALIST

## 2022-09-14 RX ORDER — NITROFURANTOIN 25; 75 MG/1; MG/1
100 CAPSULE ORAL 2 TIMES DAILY
Qty: 20 CAPSULE | Refills: 0 | Status: SHIPPED | OUTPATIENT
Start: 2022-09-14 | End: 2022-09-24

## 2022-09-14 RX ORDER — DROSPIRENONE 4 MG/1
1 TABLET, FILM COATED ORAL DAILY
Qty: 28 TABLET | Refills: 11 | Status: SHIPPED | OUTPATIENT
Start: 2022-09-14

## 2022-09-14 SDOH — ECONOMIC STABILITY: FOOD INSECURITY: WITHIN THE PAST 12 MONTHS, THE FOOD YOU BOUGHT JUST DIDN'T LAST AND YOU DIDN'T HAVE MONEY TO GET MORE.: NEVER TRUE

## 2022-09-14 SDOH — ECONOMIC STABILITY: FOOD INSECURITY: WITHIN THE PAST 12 MONTHS, YOU WORRIED THAT YOUR FOOD WOULD RUN OUT BEFORE YOU GOT MONEY TO BUY MORE.: NEVER TRUE

## 2022-09-14 ASSESSMENT — ENCOUNTER SYMPTOMS
CONSTIPATION: 0
DIARRHEA: 0
ABDOMINAL PAIN: 0
EYE PAIN: 0
APNEA: 0
NAUSEA: 0
VOMITING: 0
COUGH: 0
ABDOMINAL DISTENTION: 0

## 2022-09-14 ASSESSMENT — PATIENT HEALTH QUESTIONNAIRE - PHQ9
1. LITTLE INTEREST OR PLEASURE IN DOING THINGS: 0
SUM OF ALL RESPONSES TO PHQ QUESTIONS 1-9: 0
2. FEELING DOWN, DEPRESSED OR HOPELESS: 0
SUM OF ALL RESPONSES TO PHQ9 QUESTIONS 1 & 2: 0
SUM OF ALL RESPONSES TO PHQ QUESTIONS 1-9: 0

## 2022-09-14 ASSESSMENT — SOCIAL DETERMINANTS OF HEALTH (SDOH): HOW HARD IS IT FOR YOU TO PAY FOR THE VERY BASICS LIKE FOOD, HOUSING, MEDICAL CARE, AND HEATING?: NOT HARD AT ALL

## 2022-09-14 NOTE — PROGRESS NOTES
Subjective:      Patient ID: Arlene Rawls is a 34 y.o. female. Chief Complaint   Patient presents with    Annual Exam    Gynecologic Exam     /80 (Site: Left Upper Arm, Position: Sitting)   Pulse 84   Ht 5' 1\" (1.549 m)   Wt 186 lb 6.4 oz (84.6 kg)   BMI 35.22 kg/m²   No LMP recorded. [id number]    Past Medical History:   Diagnosis Date    ADHD 01/04/2021    Anemia     Anemia of pregnancy- started on Iron    Complication of anesthesia     2010 with epidural she has numbness on only one side, but has had 2 other epidurals without incidence    Fatty liver 06/06/2018    GDMA1 ([address]) 03/25/2021    History of Thyroid Disorder 09/03/2014    hyperthyroidism with 3rd pregnancy but has resolved    Ovarian cyst     Postpartum depression     2010 and was on meds for short term     Current Outpatient Medications Ordered in Epic   Medication Sig Dispense Refill    Drospirenone (SLYND) 4 MG TABS Take 1 tablet by mouth daily 28 tablet 11    nitrofurantoin, macrocrystal-monohydrate, (MACROBID) 100 MG capsule Take 1 capsule by mouth 2 times daily for 20 doses 20 capsule 0    escitalopram (LEXAPRO) 10 MG tablet take 1 tablet by mouth once daily 30 tablet 5    busPIRone (BUSPAR) 5 MG tablet take 1 tablet by mouth three times a day if needed for anxiety 90 tablet 5    ibuprofen (ADVIL;MOTRIN) 800 MG tablet Take 1 tablet by mouth every 8 hours (Patient not taking: Reported on 9/14/2022) 20 tablet 0    metroNIDAZOLE (METROGEL VAGINAL) 0.75 % vaginal gel Place vaginally nightly for 5 nights (Patient not taking: No sig reported) 1 each 0    promethazine (PHENERGAN) 25 MG tablet Take 25 mg by mouth every 6 hours as needed for Nausea      Prenatal Vit-Fe Fumarate-FA (PNV PRENATAL PLUS MULTIVITAMIN) 27-1 MG TABS Take 1 tablet by mouth daily 30 tablet 11     No current [facility]-ordered facility-administered medications on file.      Problem List Items Addressed This Visit       Well woman exam with routine gynecological exam - Primary UTI symptoms    Relevant Orders    POCT Urinalysis No Micro (Auto)    Urinalysis with Reflex to Culture    Dysmenorrhea    Encounter for birth control pills maintenance     Allergies   Allergen Reactions    Penicillin G Other (See Comments)     Patient reports kidneys hurt after medication    Topamax [Topiramate] Other (See Comments)     paralysis    Zoloft [Sertraline Hcl] Other (See Comments)     confusion     Orders Placed This Encounter   Procedures    Urinalysis with Reflex to Culture     Standing Status:   Future     Standing Expiration Date:   11/14/2022     Order Specific Question:   SPECIFY(EX-CATH,MIDSTREAM,CYSTO,ETC)? Answer:   midstream    POCT Urinalysis No Micro (Auto)            HPI  Patient is here for an annual exam today. Her last pap smear was 12/1/2020 and was negative for intraepithelial lesion or malignancy with negative HPV. Patient thinks that she has a UTI today. She uses Slynd birth control pills for treatment of dysmenorrhea and she needs refills today. Review of Systems   Constitutional:  Negative for activity change, appetite change and fever. HENT:  Negative for ear discharge and ear pain. Eyes:  Negative for pain and visual disturbance. Respiratory:  Negative for apnea and cough. Cardiovascular:  Negative for chest pain, palpitations and leg swelling. Gastrointestinal:  Negative for abdominal distention, abdominal pain, constipation, diarrhea, nausea and vomiting. Endocrine: Negative. Genitourinary:  Positive for dysuria. Negative for difficulty urinating, menstrual problem and pelvic pain. Musculoskeletal:  Negative for neck pain and neck stiffness. Skin: Negative. Neurological:  Negative for light-headedness and numbness. Hematological: Negative. Does not bruise/bleed easily. Objective:   Physical Exam  Vitals and nursing note reviewed. Exam conducted with a chaperone present. Constitutional:       Appearance: She is well-developed.    HENT: Head: Normocephalic and atraumatic. Neck:      Thyroid: No thyroid mass or thyromegaly. Cardiovascular:      Rate and Rhythm: Normal rate and regular rhythm. Pulmonary:      Effort: Pulmonary effort is normal.      Breath sounds: Normal breath sounds. Chest:   Breasts:     Right: Normal. No inverted nipple, mass, nipple discharge, skin change or tenderness. Left: Normal. No inverted nipple, mass, nipple discharge, skin change or tenderness. Abdominal:      General: Bowel sounds are normal. There is no distension. Palpations: Abdomen is soft. There is no mass. Tenderness: There is no abdominal tenderness. There is no guarding or rebound. Hernia: There is no hernia in the left inguinal area. Genitourinary:     General: Normal vulva. Exam position: Supine. Labia:         Right: No rash or lesion. Left: No rash or lesion. Vagina: Normal. No signs of injury. No vaginal discharge or tenderness. Cervix: No cervical motion tenderness or discharge. Uterus: Normal. Not enlarged, not fixed and not tender. Adnexa: Right adnexa normal and left adnexa normal.        Right: No mass or tenderness. Left: No mass or tenderness. Rectum: Normal. No mass or anal fissure. Normal anal tone. Musculoskeletal:         General: No tenderness. Normal range of motion. Skin:     General: Skin is warm and dry. Neurological:      Mental Status: She is alert and oriented to person, place, and time. Psychiatric:         Judgment: Judgment normal.       Assessment:      Patient with normal annual exam.  Pap smear was not done due to ASCCP guidelines. Patient with UTI symptoms. Urinalysis in office today is positive for UTI. Will treat with Macrobid. Urine sent to lab for culture and sensitivity. If different treatment is needed upon receipt of result, the office will contact her.   Patient with dysmenorrhea requesting refills of Slynd, which will be provided. Plan:      Orders Placed This Encounter   Procedures    Urinalysis with Reflex to Culture    POCT Urinalysis No Micro (Auto)     Orders Placed This Encounter   Medications    Drospirenone (SLYND) 4 MG TABS     Sig: Take 1 tablet by mouth daily     Dispense:  28 tablet     Refill:  11    nitrofurantoin, macrocrystal-monohydrate, (MACROBID) 100 MG capsule     Sig: Take 1 capsule by mouth 2 times daily for 20 doses     Dispense:  20 capsule     Refill:  0        Appointment in 1 year    IOscar, am scribing for, and in the presence of Dr. Dariel Awan. Electronically signed by: Oscar Valencia 9/14/22 2:18 PM       I agree to the above documentation placed by my scribe Oscar Valencia. I reviewed the scribe's note and agree with the documented findings and plan of care. Any areas of disagreement are noted on the chart. I have personally evaluated this patient. Additional findings are as noted. I agree with the chief complaint, past medical history, past surgical history, allergies, medications, social and family history as documented unless otherwise noted below.      Electronically signed by Dariel Awan MD on 9/14/2022 at 9:22 PM

## 2022-09-15 LAB
BACTERIA: ABNORMAL
BILIRUBIN URINE: NEGATIVE
CASTS UA: ABNORMAL /LPF (ref 0–8)
COLOR: YELLOW
EPITHELIAL CELLS UA: ABNORMAL /HPF (ref 0–5)
GLUCOSE URINE: NEGATIVE
KETONES, URINE: NEGATIVE
LEUKOCYTE ESTERASE, URINE: ABNORMAL
NITRITE, URINE: POSITIVE
PH UA: 7 (ref 5–8)
PROTEIN UA: NEGATIVE
RBC UA: ABNORMAL /HPF (ref 0–4)
SPECIFIC GRAVITY UA: 1.02 (ref 1–1.03)
TURBIDITY: CLEAR
URINE HGB: NEGATIVE
UROBILINOGEN, URINE: NORMAL
WBC UA: ABNORMAL /HPF (ref 0–5)

## 2022-10-14 PROBLEM — Z01.419 WELL WOMAN EXAM WITH ROUTINE GYNECOLOGICAL EXAM: Status: RESOLVED | Noted: 2022-09-14 | Resolved: 2022-10-14

## 2023-01-27 ENCOUNTER — HOSPITAL ENCOUNTER (OUTPATIENT)
Age: 33
Setting detail: SPECIMEN
Discharge: HOME | End: 2023-01-27

## 2023-01-27 DIAGNOSIS — N39.0 RECURRENT UTI: ICD-10-CM

## 2023-01-28 LAB
CULTURE: ABNORMAL
SPECIMEN DESCRIPTION: ABNORMAL

## 2023-09-06 RX ORDER — DROSPIRENONE 4 MG/1
TABLET, FILM COATED ORAL
Qty: 28 TABLET | Refills: 0 | Status: SHIPPED | OUTPATIENT
Start: 2023-09-06

## 2023-10-09 ENCOUNTER — TELEPHONE (OUTPATIENT)
Dept: OBGYN CLINIC | Age: 33
End: 2023-10-09

## 2023-10-09 RX ORDER — DROSPIRENONE 4 MG/1
1 TABLET, FILM COATED ORAL DAILY
Qty: 28 TABLET | Refills: 0 | Status: SHIPPED | OUTPATIENT
Start: 2023-10-09

## 2023-10-09 RX ORDER — DROSPIRENONE 4 MG/1
TABLET, FILM COATED ORAL
Qty: 28 TABLET | Refills: 0 | OUTPATIENT
Start: 2023-10-09

## 2023-10-09 NOTE — TELEPHONE ENCOUNTER
Patient is asking for a refill on her BCP's. She has an appointment 10-10-23. She has been out of her medication since Saturday. Please advise.

## 2023-10-10 ENCOUNTER — HOSPITAL ENCOUNTER (OUTPATIENT)
Age: 33
Setting detail: SPECIMEN
Discharge: HOME | End: 2023-10-10

## 2023-10-10 ENCOUNTER — OFFICE VISIT (OUTPATIENT)
Dept: OBGYN CLINIC | Age: 33
End: 2023-10-10
Payer: COMMERCIAL

## 2023-10-10 VITALS
HEIGHT: 61 IN | DIASTOLIC BLOOD PRESSURE: 78 MMHG | HEART RATE: 94 BPM | WEIGHT: 184 LBS | SYSTOLIC BLOOD PRESSURE: 120 MMHG | BODY MASS INDEX: 34.74 KG/M2

## 2023-10-10 DIAGNOSIS — Z30.41 ENCOUNTER FOR BIRTH CONTROL PILLS MAINTENANCE: ICD-10-CM

## 2023-10-10 DIAGNOSIS — N30.91 CYSTITIS WITH HEMATURIA: ICD-10-CM

## 2023-10-10 DIAGNOSIS — Z01.419 WELL WOMAN EXAM: Primary | ICD-10-CM

## 2023-10-10 DIAGNOSIS — R39.15 URINARY URGENCY: ICD-10-CM

## 2023-10-10 DIAGNOSIS — Z11.51 SCREENING FOR HPV (HUMAN PAPILLOMAVIRUS): ICD-10-CM

## 2023-10-10 LAB
BILIRUBIN, POC: ABNORMAL
BLOOD URINE, POC: ABNORMAL
CLARITY, POC: CLEAR
COLOR, POC: YELLOW
GLUCOSE URINE, POC: ABNORMAL
KETONES, POC: ABNORMAL
LEUKOCYTE EST, POC: ABNORMAL
NITRITE, POC: ABNORMAL
PH, POC: 5
PROTEIN, POC: ABNORMAL
SPECIFIC GRAVITY, POC: 1.02
UROBILINOGEN, POC: ABNORMAL

## 2023-10-10 PROCEDURE — 99395 PREV VISIT EST AGE 18-39: CPT | Performed by: CLINICAL NURSE SPECIALIST

## 2023-10-10 PROCEDURE — 81003 URINALYSIS AUTO W/O SCOPE: CPT | Performed by: CLINICAL NURSE SPECIALIST

## 2023-10-10 RX ORDER — NITROFURANTOIN 25; 75 MG/1; MG/1
100 CAPSULE ORAL 2 TIMES DAILY
Qty: 20 CAPSULE | Refills: 0 | Status: SHIPPED | OUTPATIENT
Start: 2023-10-10 | End: 2023-10-20

## 2023-10-10 RX ORDER — METRONIDAZOLE 7.5 MG/G
GEL VAGINAL
Qty: 1 EACH | Refills: 6 | Status: SHIPPED | OUTPATIENT
Start: 2023-10-10

## 2023-10-10 RX ORDER — DROSPIRENONE 4 MG/1
1 TABLET, FILM COATED ORAL DAILY
Qty: 28 TABLET | Refills: 11 | Status: SHIPPED | OUTPATIENT
Start: 2023-10-10

## 2023-10-10 RX ORDER — METRONIDAZOLE 7.5 MG/G
1 GEL VAGINAL NIGHTLY
Qty: 1 EACH | Refills: 6 | Status: SHIPPED | OUTPATIENT
Start: 2023-10-10 | End: 2023-10-10

## 2023-10-10 NOTE — PROGRESS NOTES
appreciated. ASSESSMENT: Normal annual well woman exam with UTI    28 y.o. Female; Annual   Diagnosis Orders   1. Well woman exam  PAP SMEAR      2. Screening for HPV (human papillomavirus)  PAP SMEAR      3. Encounter for birth control pills maintenance        4. Urinary urgency  POCT Urinalysis No Micro (Auto)      5. Cystitis with hematuria  POCT Urinalysis No Micro (Auto)    nitrofurantoin, macrocrystal-monohydrate, (MACROBID) 100 MG capsule        Patient instructed if she has another UTI she will need to follow up with urology                PLAN:  - Discussed new papsmear guidelines. - Birth control Discussed. - Smoking risk factors Discussed  - Diet and exercise reviewed. - Routine healthmaintenance per patients PCP.  - Return to clinic in 1 year or earlier with questions, problems, concerns. Return for 1 year for Annual and as needed.         Electronically signed by KADEEM Aguilar CNP on 10/10/2023 at 3:38 PM

## 2023-10-12 LAB
HPV I/H RISK 4 DNA CVX QL NAA+PROBE: DETECTED
HPV SAMPLE: ABNORMAL
HPV, INTERPRETATION: ABNORMAL
HPV16 DNA CVX QL NAA+PROBE: NOT DETECTED
HPV18 DNA CVX QL NAA+PROBE: NOT DETECTED
SPECIMEN DESCRIPTION: ABNORMAL

## 2023-10-17 LAB — CYTOLOGY REPORT: NORMAL

## 2023-10-24 ENCOUNTER — OFFICE VISIT (OUTPATIENT)
Dept: FAMILY MEDICINE CLINIC | Age: 33
End: 2023-10-24
Payer: COMMERCIAL

## 2023-10-24 VITALS
TEMPERATURE: 99 F | SYSTOLIC BLOOD PRESSURE: 124 MMHG | BODY MASS INDEX: 35.08 KG/M2 | DIASTOLIC BLOOD PRESSURE: 88 MMHG | WEIGHT: 185.8 LBS | HEIGHT: 61 IN | HEART RATE: 98 BPM | OXYGEN SATURATION: 99 %

## 2023-10-24 DIAGNOSIS — Z11.59 SCREENING FOR VIRAL DISEASE: ICD-10-CM

## 2023-10-24 DIAGNOSIS — Z13.220 SCREENING FOR HYPERLIPIDEMIA: ICD-10-CM

## 2023-10-24 DIAGNOSIS — Z76.89 ENCOUNTER TO ESTABLISH CARE: ICD-10-CM

## 2023-10-24 DIAGNOSIS — N39.0 RECURRENT UTI: ICD-10-CM

## 2023-10-24 DIAGNOSIS — R53.83 OTHER FATIGUE: ICD-10-CM

## 2023-10-24 DIAGNOSIS — E07.9 THYROID DISORDER: ICD-10-CM

## 2023-10-24 DIAGNOSIS — F32.A DEPRESSION, UNSPECIFIED DEPRESSION TYPE: ICD-10-CM

## 2023-10-24 DIAGNOSIS — F41.9 ANXIETY: Primary | ICD-10-CM

## 2023-10-24 PROCEDURE — 99215 OFFICE O/P EST HI 40 MIN: CPT | Performed by: NURSE PRACTITIONER

## 2023-10-24 RX ORDER — BUPROPION HYDROCHLORIDE 100 MG/1
100 TABLET, EXTENDED RELEASE ORAL 2 TIMES DAILY
Qty: 60 TABLET | Refills: 3 | Status: SHIPPED | OUTPATIENT
Start: 2023-10-24

## 2023-10-24 RX ORDER — NITROFURANTOIN 25; 75 MG/1; MG/1
100 CAPSULE ORAL 2 TIMES DAILY
COMMUNITY

## 2023-10-24 RX ORDER — HYDROXYZINE HYDROCHLORIDE 25 MG/1
25 TABLET, FILM COATED ORAL EVERY 8 HOURS PRN
Qty: 30 TABLET | Refills: 0 | Status: SHIPPED | OUTPATIENT
Start: 2023-10-24 | End: 2023-11-23

## 2023-10-24 SDOH — ECONOMIC STABILITY: FOOD INSECURITY: WITHIN THE PAST 12 MONTHS, YOU WORRIED THAT YOUR FOOD WOULD RUN OUT BEFORE YOU GOT MONEY TO BUY MORE.: NEVER TRUE

## 2023-10-24 SDOH — ECONOMIC STABILITY: INCOME INSECURITY: HOW HARD IS IT FOR YOU TO PAY FOR THE VERY BASICS LIKE FOOD, HOUSING, MEDICAL CARE, AND HEATING?: NOT HARD AT ALL

## 2023-10-24 SDOH — ECONOMIC STABILITY: HOUSING INSECURITY
IN THE LAST 12 MONTHS, WAS THERE A TIME WHEN YOU DID NOT HAVE A STEADY PLACE TO SLEEP OR SLEPT IN A SHELTER (INCLUDING NOW)?: NO

## 2023-10-24 SDOH — ECONOMIC STABILITY: FOOD INSECURITY: WITHIN THE PAST 12 MONTHS, THE FOOD YOU BOUGHT JUST DIDN'T LAST AND YOU DIDN'T HAVE MONEY TO GET MORE.: NEVER TRUE

## 2023-10-24 ASSESSMENT — ANXIETY QUESTIONNAIRES
IF YOU CHECKED OFF ANY PROBLEMS ON THIS QUESTIONNAIRE, HOW DIFFICULT HAVE THESE PROBLEMS MADE IT FOR YOU TO DO YOUR WORK, TAKE CARE OF THINGS AT HOME, OR GET ALONG WITH OTHER PEOPLE: VERY DIFFICULT
2. NOT BEING ABLE TO STOP OR CONTROL WORRYING: 2
3. WORRYING TOO MUCH ABOUT DIFFERENT THINGS: 2
5. BEING SO RESTLESS THAT IT IS HARD TO SIT STILL: 1
7. FEELING AFRAID AS IF SOMETHING AWFUL MIGHT HAPPEN: 0
6. BECOMING EASILY ANNOYED OR IRRITABLE: 0
GAD7 TOTAL SCORE: 10
4. TROUBLE RELAXING: 2
1. FEELING NERVOUS, ANXIOUS, OR ON EDGE: 3

## 2023-10-24 ASSESSMENT — ENCOUNTER SYMPTOMS
BLOOD IN STOOL: 0
WHEEZING: 0
VOMITING: 0
CHEST TIGHTNESS: 0
ABDOMINAL PAIN: 0
COUGH: 0
NAUSEA: 0
SHORTNESS OF BREATH: 0
BACK PAIN: 0
DIARRHEA: 0
ABDOMINAL DISTENTION: 0
CONSTIPATION: 0

## 2023-10-24 ASSESSMENT — PATIENT HEALTH QUESTIONNAIRE - PHQ9
SUM OF ALL RESPONSES TO PHQ QUESTIONS 1-9: 1
SUM OF ALL RESPONSES TO PHQ9 QUESTIONS 1 & 2: 1
SUM OF ALL RESPONSES TO PHQ QUESTIONS 1-9: 1
1. LITTLE INTEREST OR PLEASURE IN DOING THINGS: 0
2. FEELING DOWN, DEPRESSED OR HOPELESS: 1
SUM OF ALL RESPONSES TO PHQ QUESTIONS 1-9: 1
SUM OF ALL RESPONSES TO PHQ QUESTIONS 1-9: 1

## 2023-10-24 NOTE — PROGRESS NOTES
Visit Information    Have you changed or started any medications since your last visit including any over-the-counter medicines, vitamins, or herbal medicines? no   Have you stopped taking any of your medications? Is so, why? -  no  Are you having any side effects from any of your medications? - no    Have you seen any other physician or provider since your last visit?  no   Have you had any other diagnostic tests since your last visit?  no   Have you been seen in the emergency room and/or had an admission in a hospital since we last saw you?  no   Have you had your routine dental cleaning in the past 6 months?  no     Do you have an active MyChart account? If no, what is the barrier?   Yes    Patient Care Team:  Unknown, Provider as PCP - Priscilla Paulson MD as Consulting Physician (Obstetrics & Gynecology)  Yung Christie MD as Consulting Physician (Gastroenterology)  Sisi Price MD as Obstetrician (Perinatology)    Medical History Review  Past Medical, Family, and Social History reviewed and does contribute to the patient presenting condition    Health Maintenance   Topic Date Due    COVID-19 Vaccine (1) Never done    Varicella vaccine (2 of 2 - 13+ 2-dose series) 11/21/2017    Hepatitis B vaccine (3 of 3 - 19+ 3-dose series) 04/27/2018    Flu vaccine (1) 08/01/2023    Depression Screen  01/27/2024    DTaP/Tdap/Td vaccine (2 - Td or Tdap) 10/24/2027    Cervical cancer screen  10/10/2028    Hepatitis C screen  Completed    HIV screen  Completed    Hepatitis A vaccine  Aged Out    Hib vaccine  Aged Out    HPV vaccine  Aged Out    Meningococcal (ACWY) vaccine  Aged Out    Pneumococcal 0-64 years Vaccine  Aged Out
referral which she is not open to at this time. She states she has tried therapy in the past that just did not help her much. We discussed different medication options, she has not tried Wellbutrin and is open to doing so. She states that her depression and anxiety have been worsening recently and she thinks it is related to her job. 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 States there is a lot of inconsistency with her job and it makes anxiety and depression worse. She states she has been through a tough divorce, his young children at home, and once in the middle of nursing school when the divorce happened. We will go ahead and discontinue the BuSpar and try Atarax as well as Wellbutrin. I like to bring patient back in 2 weeks to see how she is doing. She denies any thoughts of self-harm or harming others. She is in need of ongoing paperwork filled out, which I will do for her. 10/24/2023    12:02 PM   CASSIDY-7 SCREENING   Feeling nervous, anxious, or on edge Nearly every day   Not being able to stop or control worrying More than half the days   Worrying too much about different things More than half the days   Trouble relaxing More than half the days   Being so restless that it is hard to sit still Several days   Becoming easily annoyed or irritable Not at all   Feeling afraid as if something awful might happen Not at all   CASSIDY-7 Total Score 10   How difficult have these problems made it for you to do your work, take care of things at home, or get along with other people? Very difficult        []Negative depression screening.    [x]1-4 = Minimal depression   []5-9 = Mild depression   []10-14 = Moderate depression   []15-19 = Moderately severe depression   []20-27 = Severe depression        10/24/2023    11:55 AM 1/27/2023     9:40 AM 9/14/2022     1:56 PM 1/13/2021     4:15 PM 12/16/2020     4:01 PM 9/3/2019    12:25 PM 8/28/2018    10:04 AM   PHQ Scores

## 2023-10-30 ENCOUNTER — PATIENT MESSAGE (OUTPATIENT)
Dept: FAMILY MEDICINE CLINIC | Age: 33
End: 2023-10-30

## 2023-10-31 ENCOUNTER — TELEMEDICINE (OUTPATIENT)
Dept: FAMILY MEDICINE CLINIC | Age: 33
End: 2023-10-31
Payer: COMMERCIAL

## 2023-10-31 DIAGNOSIS — F90.9 ATTENTION DEFICIT HYPERACTIVITY DISORDER (ADHD), UNSPECIFIED ADHD TYPE: Primary | ICD-10-CM

## 2023-10-31 DIAGNOSIS — F41.9 ANXIETY: ICD-10-CM

## 2023-10-31 PROCEDURE — 99423 OL DIG E/M SVC 21+ MIN: CPT | Performed by: NURSE PRACTITIONER

## 2023-10-31 ASSESSMENT — ANXIETY QUESTIONNAIRES
6. BECOMING EASILY ANNOYED OR IRRITABLE: 2
2. NOT BEING ABLE TO STOP OR CONTROL WORRYING: 3
7. FEELING AFRAID AS IF SOMETHING AWFUL MIGHT HAPPEN: 3
3. WORRYING TOO MUCH ABOUT DIFFERENT THINGS: 3
1. FEELING NERVOUS, ANXIOUS, OR ON EDGE: 3
GAD7 TOTAL SCORE: 17
5. BEING SO RESTLESS THAT IT IS HARD TO SIT STILL: 1
4. TROUBLE RELAXING: 2

## 2023-10-31 ASSESSMENT — ENCOUNTER SYMPTOMS
ABDOMINAL DISTENTION: 0
CHEST TIGHTNESS: 0
WHEEZING: 0
VOMITING: 0
BACK PAIN: 0
NAUSEA: 0
SHORTNESS OF BREATH: 0
COUGH: 0
ABDOMINAL PAIN: 0
DIARRHEA: 0
CONSTIPATION: 0
BLOOD IN STOOL: 0

## 2023-10-31 ASSESSMENT — PATIENT HEALTH QUESTIONNAIRE - PHQ9
9. THOUGHTS THAT YOU WOULD BE BETTER OFF DEAD, OR OF HURTING YOURSELF: 0
SUM OF ALL RESPONSES TO PHQ QUESTIONS 1-9: 9
3. TROUBLE FALLING OR STAYING ASLEEP: 1
4. FEELING TIRED OR HAVING LITTLE ENERGY: 1
1. LITTLE INTEREST OR PLEASURE IN DOING THINGS: 3
6. FEELING BAD ABOUT YOURSELF - OR THAT YOU ARE A FAILURE OR HAVE LET YOURSELF OR YOUR FAMILY DOWN: 1
SUM OF ALL RESPONSES TO PHQ QUESTIONS 1-9: 9
SUM OF ALL RESPONSES TO PHQ9 QUESTIONS 1 & 2: 5
7. TROUBLE CONCENTRATING ON THINGS, SUCH AS READING THE NEWSPAPER OR WATCHING TELEVISION: 1
10. IF YOU CHECKED OFF ANY PROBLEMS, HOW DIFFICULT HAVE THESE PROBLEMS MADE IT FOR YOU TO DO YOUR WORK, TAKE CARE OF THINGS AT HOME, OR GET ALONG WITH OTHER PEOPLE: 3
2. FEELING DOWN, DEPRESSED OR HOPELESS: 2
SUM OF ALL RESPONSES TO PHQ QUESTIONS 1-9: 9
5. POOR APPETITE OR OVEREATING: 0
8. MOVING OR SPEAKING SO SLOWLY THAT OTHER PEOPLE COULD HAVE NOTICED. OR THE OPPOSITE, BEING SO FIGETY OR RESTLESS THAT YOU HAVE BEEN MOVING AROUND A LOT MORE THAN USUAL: 0
SUM OF ALL RESPONSES TO PHQ QUESTIONS 1-9: 9

## 2023-10-31 NOTE — PROGRESS NOTES
10/31/2023    TELEHEALTH EVALUATION -- Audio/Visual (During OCOWT-04 public health emergency)      Elif Anderson (:  1990) is a 28 y.o. female,Established patient, here for evaluation of the following chief complaint(s): extension for FMLA, letter for work and referral for psych        ASSESSMENT/PLAN:    1. Attention deficit hyperactivity disorder (ADHD), unspecified ADHD type  -     Noelle Velasquez MD, Psychiatry, [facility]  2. Anxiety  -     Noelle Velasquez MD, Psychiatry, [facility] received counseling on the following healthy behaviors: medication adherence  Reviewed prior labs and health maintenance  Discussed use, benefit, and side effects of prescribed medications. Barriers to medication compliance addressed. Patient given educational materials - see patient instructions  All patient questions answered. Patient voiced understanding. The patient's past medical,surgical, social, and family history as well as her current medications and allergies were reviewed as documented in today's encounter. Medications, labs, diagnostic studies, consultations and follow-up as documented in this encounter. No follow-ups on file. Data Unavailable    Future Appointments   Date Time Provider [address]   2023  2:30 PM [facility]   10/15/2024  3:30 PM KADEEM Booker CNP [facility]         SUBJECTIVE/OBJECTIVE:  Haley Powell [facility] (:  1990) has requested an audio/video evaluation for the following concern(s):extension for FMLA, letter for work and referral for psych    Patient is here today to discuss worsening anxiety. She states she was post go back to work today, but called off due to her anxiety. States she woke up in the middle the night with a panic attack and was unable to relax again. She had been off work for 2 weeks due to ough, which in turn made the anxiety worse.   She has been on Zoloft Lexapro and BuSpar in the past without

## 2023-11-08 NOTE — TELEPHONE ENCOUNTER
From: Kayla Jean  Sent: 11/7/2023 4:34 PM EST  To: [facility] Clinical Support Pool  Subject: Work    I have not tried keeping a journal. Just write down when I get anxious?

## 2023-11-09 ENCOUNTER — HOSPITAL ENCOUNTER (OUTPATIENT)
Age: 33
Discharge: HOME | End: 2023-11-09
Payer: COMMERCIAL

## 2023-11-09 DIAGNOSIS — R53.83 OTHER FATIGUE: ICD-10-CM

## 2023-11-09 DIAGNOSIS — Z13.220 SCREENING FOR HYPERLIPIDEMIA: ICD-10-CM

## 2023-11-09 DIAGNOSIS — N39.0 RECURRENT UTI: ICD-10-CM

## 2023-11-09 DIAGNOSIS — Z11.59 SCREENING FOR VIRAL DISEASE: ICD-10-CM

## 2023-11-09 DIAGNOSIS — E07.9 THYROID DISORDER: ICD-10-CM

## 2023-11-09 LAB
25(OH)D3 SERPL-MCNC: 28.8 NG/ML
ALBUMIN SERPL-MCNC: 4.3 G/DL (ref 3.5–5.2)
ALP SERPL-CCNC: 74 U/L (ref 35–104)
ALT SERPL-CCNC: 109 U/L (ref 5–33)
ANION GAP SERPL CALCULATED.3IONS-SCNC: 17 MMOL/L (ref 9–17)
AST SERPL-CCNC: 154 U/L
BACTERIA URNS QL MICRO: ABNORMAL
BASOPHILS # BLD: 0 K/UL (ref 0–0.2)
BASOPHILS NFR BLD: 0 % (ref 0–2)
BILIRUB SERPL-MCNC: 1.4 MG/DL (ref 0.3–1.2)
BILIRUB UR QL STRIP: ABNORMAL
BUN SERPL-MCNC: 10 MG/DL (ref 6–20)
CALCIUM SERPL-MCNC: 9.7 MG/DL (ref 8.6–10.4)
CASTS #/AREA URNS LPF: ABNORMAL /LPF
CHLORIDE SERPL-SCNC: 100 MMOL/L (ref 98–107)
CHOLEST SERPL-MCNC: 182 MG/DL
CHOLESTEROL/HDL RATIO: 2.5
CLARITY UR: ABNORMAL
CO2 SERPL-SCNC: 24 MMOL/L (ref 20–31)
COLOR UR: ABNORMAL
CREAT SERPL-MCNC: 0.6 MG/DL (ref 0.5–0.9)
EOSINOPHIL # BLD: 0.2 K/UL (ref 0–0.4)
EOSINOPHILS RELATIVE PERCENT: 3 % (ref 0–4)
EPI CELLS #/AREA URNS HPF: ABNORMAL /HPF
ERYTHROCYTE [DISTWIDTH] IN BLOOD BY AUTOMATED COUNT: 13.4 % (ref 11.5–14.9)
FOLATE SERPL-MCNC: 6.7 NG/ML
GFR SERPL CREATININE-BSD FRML MDRD: >60 ML/MIN/1.73M2
GLUCOSE SERPL-MCNC: 91 MG/DL (ref 70–99)
GLUCOSE UR STRIP-MCNC: NEGATIVE MG/DL
HBV SURFACE AB SERPL IA-ACNC: <3.5 MIU/ML
HCT VFR BLD AUTO: 42.3 % (ref 36–46)
HDLC SERPL-MCNC: 72 MG/DL
HGB BLD-MCNC: 14.2 G/DL (ref 12–16)
HGB UR QL STRIP.AUTO: NEGATIVE
KETONES UR STRIP-MCNC: ABNORMAL MG/DL
LDLC SERPL CALC-MCNC: 91 MG/DL (ref 0–130)
LEUKOCYTE ESTERASE UR QL STRIP: ABNORMAL
LYMPHOCYTES NFR BLD: 1.1 K/UL (ref 1–4.8)
LYMPHOCYTES RELATIVE PERCENT: 18 % (ref 24–44)
MAGNESIUM SERPL-MCNC: 1.8 MG/DL (ref 1.6–2.6)
MCH RBC QN AUTO: 35.5 PG (ref 26–34)
MCHC RBC AUTO-ENTMCNC: 33.6 G/DL (ref 31–37)
MCV RBC AUTO: 105.7 FL (ref 80–100)
MONOCYTES NFR BLD: 0.7 K/UL (ref 0.1–1.3)
MONOCYTES NFR BLD: 11 % (ref 1–7)
NEUTROPHILS NFR BLD: 68 % (ref 36–66)
NEUTS SEG NFR BLD: 4.3 K/UL (ref 1.3–9.1)
NITRITE UR QL STRIP: POSITIVE
PH UR STRIP: 6.5 [PH] (ref 5–8)
PHOSPHATE SERPL-MCNC: 3 MG/DL (ref 2.6–4.5)
PLATELET # BLD AUTO: 182 K/UL (ref 150–450)
PMV BLD AUTO: 9.3 FL (ref 6–12)
POTASSIUM SERPL-SCNC: 3.4 MMOL/L (ref 3.7–5.3)
PROT SERPL-MCNC: 8.1 G/DL (ref 6.4–8.3)
PROT UR STRIP-MCNC: ABNORMAL MG/DL
RBC # BLD AUTO: 4.01 M/UL (ref 4–5.2)
RBC #/AREA URNS HPF: ABNORMAL /HPF
SODIUM SERPL-SCNC: 141 MMOL/L (ref 135–144)
SP GR UR STRIP: 1.02 (ref 1–1.03)
T4 FREE SERPL-MCNC: 1.3 NG/DL (ref 0.9–1.7)
TRIGL SERPL-MCNC: 97 MG/DL
TSH SERPL DL<=0.05 MIU/L-ACNC: 1.26 UIU/ML (ref 0.3–5)
UROBILINOGEN UR STRIP-ACNC: NORMAL EU/DL (ref 0–1)
VIT B12 SERPL-MCNC: 603 PG/ML (ref 232–1245)
WBC #/AREA URNS HPF: ABNORMAL /HPF
WBC OTHER # BLD: 6.3 K/UL (ref 3.5–11)

## 2023-11-09 PROCEDURE — 80053 COMPREHEN METABOLIC PANEL: CPT

## 2023-11-09 PROCEDURE — 85025 COMPLETE CBC W/AUTO DIFF WBC: CPT

## 2023-11-09 PROCEDURE — 83735 ASSAY OF MAGNESIUM: CPT

## 2023-11-09 PROCEDURE — 87086 URINE CULTURE/COLONY COUNT: CPT

## 2023-11-09 PROCEDURE — 81001 URINALYSIS AUTO W/SCOPE: CPT

## 2023-11-09 PROCEDURE — 84439 ASSAY OF FREE THYROXINE: CPT

## 2023-11-09 PROCEDURE — 86317 IMMUNOASSAY INFECTIOUS AGENT: CPT

## 2023-11-09 PROCEDURE — 82746 ASSAY OF FOLIC ACID SERUM: CPT

## 2023-11-09 PROCEDURE — 84100 ASSAY OF PHOSPHORUS: CPT

## 2023-11-09 PROCEDURE — 84443 ASSAY THYROID STIM HORMONE: CPT

## 2023-11-09 PROCEDURE — 82306 VITAMIN D 25 HYDROXY: CPT

## 2023-11-09 PROCEDURE — 36415 COLL VENOUS BLD VENIPUNCTURE: CPT

## 2023-11-09 PROCEDURE — 80061 LIPID PANEL: CPT

## 2023-11-09 PROCEDURE — 82607 VITAMIN B-12: CPT

## 2023-11-09 PROCEDURE — 86787 VARICELLA-ZOSTER ANTIBODY: CPT

## 2023-11-10 LAB — VZV IGG SER QL IA: 1.93

## 2023-11-11 LAB
MICROORGANISM SPEC CULT: NORMAL
SPECIMEN DESCRIPTION: NORMAL

## 2023-11-12 DIAGNOSIS — R79.89 ELEVATED LFTS: Primary | ICD-10-CM

## 2023-11-12 DIAGNOSIS — N39.0 URINARY TRACT INFECTION WITH HEMATURIA, SITE UNSPECIFIED: ICD-10-CM

## 2023-11-12 DIAGNOSIS — E55.9 VITAMIN D DEFICIENCY: ICD-10-CM

## 2023-11-12 DIAGNOSIS — R31.9 URINARY TRACT INFECTION WITH HEMATURIA, SITE UNSPECIFIED: ICD-10-CM

## 2023-11-12 RX ORDER — SULFAMETHOXAZOLE AND TRIMETHOPRIM 800; 160 MG/1; MG/1
1 TABLET ORAL 2 TIMES DAILY
Qty: 14 TABLET | Refills: 0 | Status: SHIPPED | OUTPATIENT
Start: 2023-11-12 | End: 2023-11-19

## 2023-11-12 RX ORDER — CHOLECALCIFEROL (VITAMIN D3) 50 MCG
2000 TABLET ORAL DAILY
Qty: 30 TABLET | Refills: 3 | Status: SHIPPED | OUTPATIENT
Start: 2023-11-12

## 2023-11-14 RX ORDER — DROSPIRENONE 4 MG/1
1 TABLET, FILM COATED ORAL DAILY
Qty: 28 TABLET | Refills: 0 | OUTPATIENT
Start: 2023-11-14

## 2023-11-14 RX ORDER — DROSPIRENONE 4 MG/1
1 TABLET, FILM COATED ORAL DAILY
Qty: 28 TABLET | Refills: 7 | Status: SHIPPED | OUTPATIENT
Start: 2023-11-14

## 2023-11-16 ENCOUNTER — PATIENT MESSAGE (OUTPATIENT)
Dept: FAMILY MEDICINE CLINIC | Age: 33
End: 2023-11-16

## 2023-11-22 ENCOUNTER — TELEMEDICINE (OUTPATIENT)
Dept: FAMILY MEDICINE CLINIC | Age: 33
End: 2023-11-22
Payer: COMMERCIAL

## 2023-11-22 DIAGNOSIS — F41.9 ANXIETY: Primary | ICD-10-CM

## 2023-11-22 DIAGNOSIS — R79.89 ELEVATED LFTS: ICD-10-CM

## 2023-11-22 PROCEDURE — 99423 OL DIG E/M SVC 21+ MIN: CPT | Performed by: NURSE PRACTITIONER

## 2023-11-22 ASSESSMENT — ANXIETY QUESTIONNAIRES
3. WORRYING TOO MUCH ABOUT DIFFERENT THINGS: 3
2. NOT BEING ABLE TO STOP OR CONTROL WORRYING: 2
4. TROUBLE RELAXING: 3
7. FEELING AFRAID AS IF SOMETHING AWFUL MIGHT HAPPEN: 2
5. BEING SO RESTLESS THAT IT IS HARD TO SIT STILL: 2
GAD7 TOTAL SCORE: 15
1. FEELING NERVOUS, ANXIOUS, OR ON EDGE: 2
6. BECOMING EASILY ANNOYED OR IRRITABLE: 1

## 2023-11-22 ASSESSMENT — ENCOUNTER SYMPTOMS
CONSTIPATION: 0
BACK PAIN: 0
ABDOMINAL DISTENTION: 0
DIARRHEA: 0
BLOOD IN STOOL: 0
CHEST TIGHTNESS: 0
SHORTNESS OF BREATH: 0
NAUSEA: 0
WHEEZING: 0
VOMITING: 0
ABDOMINAL PAIN: 0
COUGH: 0

## 2023-11-22 NOTE — PROGRESS NOTES
2023    TELEHEALTH EVALUATION -- Audio/Visual      Laureen Jean (:  1990) is a 28 y.o. female,Established patient, here for evaluation of the following chief complaint(s): No chief complaint on file. ASSESSMENT/PLAN:    1. 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, she has missed work and this all started when she got COVID in the beginning of October  -She did have an initial evaluation with a therapist with [facility], and has an upcoming appointment with psychiatrist on   -Patient is very hesitant to try other medication including Wellbutrin or any other as needed medications because she has had a lot of side effects, she states she would like to discuss medication with the psychiatrist before trying something new  -She has been trying journaling as well as meditation with some improvement, she states she does not feel as tearful as she was  -Denies any thoughts of self-harm or harming others  -FMLA filled out    2. Elevated LFTs  -Liver ultrasound was ordered, patient states that she is unable to afford this  -She states for the last year she was drinking quite a bit of alcohol, not every night but often  -About a month or so ago she decided to quit this, and has not drink  -Plan to repeat LFTs in about a month  -Avoid alcohol, avoid fatty foods, avoid Tylenol  -Patient denies any abdominal pain, agrees the plan of care  -     Hepatic Function Panel; Future      Laureen received counseling on the following healthy behaviors: nutrition, exercise, and medication adherence  Reviewed prior labs and health maintenance  Discussed use, benefit, and side effects of prescribed medications. Barriers to medication compliance addressed.

## 2023-11-27 ENCOUNTER — PATIENT MESSAGE (OUTPATIENT)
Dept: FAMILY MEDICINE CLINIC | Age: 33
End: 2023-11-27

## 2024-01-10 ENCOUNTER — PATIENT MESSAGE (OUTPATIENT)
Dept: FAMILY MEDICINE CLINIC | Age: 34
End: 2024-01-10

## 2024-03-11 ENCOUNTER — TELEPHONE (OUTPATIENT)
Dept: OBGYN CLINIC | Age: 34
End: 2024-03-11

## 2024-03-11 NOTE — TELEPHONE ENCOUNTER
Patient has to have a prior authorization on her Slynd birth control and that was just submitted today. Patient is requesting 2 months of samples.

## 2024-03-12 RX ORDER — DROSPIRENONE 4 MG/1
1 TABLET, FILM COATED ORAL DAILY
Qty: 56 TABLET | Refills: 0 | Status: SHIPPED | COMMUNITY
Start: 2024-03-12

## 2024-03-12 NOTE — TELEPHONE ENCOUNTER
Samples of Slynd were given to the patient, quantity 2 boxes, Lot Number [id number] Exp 11/30/2025, NDC 6008-1144-55

## 2024-08-27 RX ORDER — DROSPIRENONE 4 MG/1
1 TABLET, FILM COATED ORAL DAILY
Qty: 56 TABLET | Refills: 0 | Status: SHIPPED | OUTPATIENT
Start: 2024-08-27

## 2024-08-27 RX ORDER — DROSPIRENONE 4 MG/1
1 TABLET, FILM COATED ORAL DAILY
Qty: 28 TABLET | Refills: 2 | Status: SHIPPED | OUTPATIENT
Start: 2024-08-27

## 2024-09-25 ENCOUNTER — TELEPHONE (OUTPATIENT)
Dept: OBGYN CLINIC | Age: 34
End: 2024-09-25

## 2024-09-25 RX ORDER — DROSPIRENONE 4 MG/1
1 TABLET, FILM COATED ORAL DAILY
Qty: 28 TABLET | Refills: 0 | Status: SHIPPED | OUTPATIENT
Start: 2024-09-25

## 2024-09-25 NOTE — TELEPHONE ENCOUNTER
Pt requesting new script for BC to be sent to [facility] pharmacy   ( Per pt- sarah- never has her script in stock)

## 2024-10-15 ENCOUNTER — HOSPITAL ENCOUNTER (OUTPATIENT)
Age: 34
Setting detail: SPECIMEN
Discharge: HOME | End: 2024-10-15

## 2024-10-15 ENCOUNTER — OFFICE VISIT (OUTPATIENT)
Dept: OBGYN CLINIC | Age: 34
End: 2024-10-15
Payer: COMMERCIAL

## 2024-10-15 VITALS
WEIGHT: 193 LBS | BODY MASS INDEX: 36.44 KG/M2 | SYSTOLIC BLOOD PRESSURE: 122 MMHG | HEIGHT: 61 IN | HEART RATE: 94 BPM | DIASTOLIC BLOOD PRESSURE: 76 MMHG

## 2024-10-15 DIAGNOSIS — Z30.41 ENCOUNTER FOR BIRTH CONTROL PILLS MAINTENANCE: ICD-10-CM

## 2024-10-15 DIAGNOSIS — Z01.419 WELL WOMAN EXAM: Primary | ICD-10-CM

## 2024-10-15 DIAGNOSIS — Z87.42 HISTORY OF DYSMENORRHEA: ICD-10-CM

## 2024-10-15 DIAGNOSIS — R35.0 URINARY FREQUENCY: ICD-10-CM

## 2024-10-15 DIAGNOSIS — F41.9 ANXIETY: ICD-10-CM

## 2024-10-15 DIAGNOSIS — N30.00 ACUTE CYSTITIS WITHOUT HEMATURIA: ICD-10-CM

## 2024-10-15 DIAGNOSIS — Z11.51 SCREENING FOR HPV (HUMAN PAPILLOMAVIRUS): ICD-10-CM

## 2024-10-15 LAB
BILIRUBIN, POC: NORMAL
BLOOD URINE, POC: NORMAL
CLARITY, POC: NORMAL
COLOR, POC: NORMAL
GLUCOSE URINE, POC: NORMAL MG/DL
KETONES, POC: NORMAL MG/DL
LEUKOCYTE EST, POC: NORMAL
NITRITE, POC: NORMAL
PH, POC: 5
PROTEIN, POC: NORMAL MG/DL
SPECIFIC GRAVITY, POC: 1.03
UROBILINOGEN, POC: NORMAL MG/DL

## 2024-10-15 PROCEDURE — 99213 OFFICE O/P EST LOW 20 MIN: CPT | Performed by: CLINICAL NURSE SPECIALIST

## 2024-10-15 PROCEDURE — 81003 URINALYSIS AUTO W/O SCOPE: CPT | Performed by: CLINICAL NURSE SPECIALIST

## 2024-10-15 PROCEDURE — 99395 PREV VISIT EST AGE 18-39: CPT | Performed by: CLINICAL NURSE SPECIALIST

## 2024-10-15 RX ORDER — NITROFURANTOIN 25; 75 MG/1; MG/1
100 CAPSULE ORAL 2 TIMES DAILY
Qty: 20 CAPSULE | Refills: 0 | Status: SHIPPED | OUTPATIENT
Start: 2024-10-15 | End: 2024-10-25

## 2024-10-15 RX ORDER — PROPRANOLOL HCL 10 MG
10 TABLET ORAL DAILY
Qty: 90 TABLET | Refills: 0 | Status: SHIPPED | OUTPATIENT
Start: 2024-10-15

## 2024-10-15 RX ORDER — DROSPIRENONE 4 MG/1
1 TABLET, FILM COATED ORAL DAILY
Qty: 84 TABLET | Refills: 3 | Status: SHIPPED | OUTPATIENT
Start: 2024-10-15

## 2024-10-15 RX ORDER — LORAZEPAM 0.5 MG/1
TABLET ORAL
COMMUNITY
Start: 2024-01-15

## 2024-10-15 ASSESSMENT — PATIENT HEALTH QUESTIONNAIRE - PHQ9
7. TROUBLE CONCENTRATING ON THINGS, SUCH AS READING THE NEWSPAPER OR WATCHING TELEVISION: NEARLY EVERY DAY
3. TROUBLE FALLING OR STAYING ASLEEP: NOT AT ALL
6. FEELING BAD ABOUT YOURSELF - OR THAT YOU ARE A FAILURE OR HAVE LET YOURSELF OR YOUR FAMILY DOWN: NOT AT ALL
SUM OF ALL RESPONSES TO PHQ9 QUESTIONS 1 & 2: 0
1. LITTLE INTEREST OR PLEASURE IN DOING THINGS: NOT AT ALL
SUM OF ALL RESPONSES TO PHQ QUESTIONS 1-9: 3
2. FEELING DOWN, DEPRESSED OR HOPELESS: NOT AT ALL
4. FEELING TIRED OR HAVING LITTLE ENERGY: NOT AT ALL
SUM OF ALL RESPONSES TO PHQ QUESTIONS 1-9: 3
SUM OF ALL RESPONSES TO PHQ QUESTIONS 1-9: 3
5. POOR APPETITE OR OVEREATING: NOT AT ALL
9. THOUGHTS THAT YOU WOULD BE BETTER OFF DEAD, OR OF HURTING YOURSELF: NOT AT ALL
SUM OF ALL RESPONSES TO PHQ QUESTIONS 1-9: 3
10. IF YOU CHECKED OFF ANY PROBLEMS, HOW DIFFICULT HAVE THESE PROBLEMS MADE IT FOR YOU TO DO YOUR WORK, TAKE CARE OF THINGS AT HOME, OR GET ALONG WITH OTHER PEOPLE: NOT DIFFICULT AT ALL
8. MOVING OR SPEAKING SO SLOWLY THAT OTHER PEOPLE COULD HAVE NOTICED. OR THE OPPOSITE, BEING SO FIGETY OR RESTLESS THAT YOU HAVE BEEN MOVING AROUND A LOT MORE THAN USUAL: NOT AT ALL

## 2024-10-15 NOTE — PROGRESS NOTES
[facility], [facility] OBSTETRICS & GYNECOLOGY  [address]  [place] [address]  Dept: [phone]        DATE OF VISIT:  10/15/24        History and Physical    Laureen Jean    :  1990  CHIEF COMPLAINT:    Chief Complaint   Patient presents with    Annual Exam                    Laureen Jean is a 34 y.o. female who presents for annual well woman exam.  Patient is requesting refills on birth control for dysmenorrhea. Patient is asking for propanolol refill for her anxiety.     The patient was seen and examined.  Per the patient bowels are regular. She has no voiding complaints.  She denies any bloating as well as vaginal discharge.    Chaperone for Intimate Exam  Chaperone was offered as part of the rooming process. Patient declined and agrees to continue with exam without a chaperone.  Chaperone: none     _____________________________________________________________________  Past Medical History:   Diagnosis Date    ADHD 2021    Anemia     Anemia of pregnancy- started on Iron    Complication of anesthesia      with epidural she has numbness on only one side, but has had 2 other epidurals without incidence    Fatty liver 2018    GDMA1 (G7) 2021    History of Thyroid Disorder 2014    hyperthyroidism with 3rd pregnancy but has resolved    Ovarian cyst     Postpartum depression      and was on meds for short term                                                                   Past Surgical History:   Procedure Laterality Date    CHOLECYSTECTOMY       History reviewed. No pertinent family history.  Social History     Tobacco Use   Smoking Status Every Day    Current packs/day: 0.00    Average packs/day: 1 pack/day for 10.0 years (10.0 ttl pk-yrs)    Types: Cigarettes    Last attempt to quit: 10/18/2020    Years since quitting: 3.9   Smokeless Tobacco Never   Tobacco Comments    Quit with pregnancy over past 12

## 2024-10-17 LAB
HPV I/H RISK 4 DNA CVX QL NAA+PROBE: NOT DETECTED
HPV SAMPLE: NORMAL
HPV, INTERPRETATION: NORMAL
HPV16 DNA CVX QL NAA+PROBE: NOT DETECTED
HPV18 DNA CVX QL NAA+PROBE: NOT DETECTED
SPECIMEN DESCRIPTION: NORMAL

## 2024-10-22 ENCOUNTER — TELEPHONE (OUTPATIENT)
Dept: OBGYN CLINIC | Age: 34
End: 2024-10-22

## 2024-10-22 LAB — CYTOLOGY REPORT: NORMAL

## 2024-10-22 NOTE — TELEPHONE ENCOUNTER
----- Message from KADEEM Mathews CNP sent at 10/22/2024  1:03 PM EDT -----  Please let the patient know that she ahs ASCUS on pap  neg HPV, and per the asccp guidelines she needs colpo

## 2024-12-04 ENCOUNTER — PROCEDURE VISIT (OUTPATIENT)
Dept: OBGYN CLINIC | Age: 34
End: 2024-12-04

## 2024-12-04 ENCOUNTER — HOSPITAL ENCOUNTER (OUTPATIENT)
Age: 34
Setting detail: SPECIMEN
Discharge: HOME | End: 2024-12-04

## 2024-12-04 VITALS
HEIGHT: 61 IN | WEIGHT: 191 LBS | HEART RATE: 103 BPM | BODY MASS INDEX: 36.06 KG/M2 | DIASTOLIC BLOOD PRESSURE: 78 MMHG | SYSTOLIC BLOOD PRESSURE: 116 MMHG

## 2024-12-04 DIAGNOSIS — R87.610 ASCUS OF CERVIX WITH NEGATIVE HIGH RISK HPV: Primary | ICD-10-CM

## 2024-12-04 PROBLEM — Z30.41 ENCOUNTER FOR BIRTH CONTROL PILLS MAINTENANCE: Status: RESOLVED | Noted: 2022-09-14 | Resolved: 2024-12-04

## 2024-12-04 PROBLEM — O09.93 HIGH-RISK PREGNANCY IN THIRD TRIMESTER: Status: RESOLVED | Noted: 2021-05-13 | Resolved: 2024-12-04

## 2024-12-04 RX ORDER — IBUPROFEN 800 MG/1
800 TABLET, FILM COATED ORAL EVERY 8 HOURS PRN
Qty: 90 TABLET | Refills: 0 | Status: SHIPPED | OUTPATIENT
Start: 2024-12-04

## 2024-12-04 NOTE — PROGRESS NOTES
OB/GYN Procedure Note  [facility]N     Laureen RIVERO Pocs  2025                       Primary Care Physician: Rocael Rivera, APRN - CNP      Subjective:   Laureen RIVERO Pocs 34 y.o. female  is here for previously scheduled colposcopy due to history of persistent abnormal pap smear. In  pap LSIL, HPV other HR+. Repeat pap 10/15/24 ASCUS, HPV negative.  She does not get a menstrual cycle with her birth control. She has no other acute complaints.    All previous paps normal  10/10/23 LSIL, HPV other HR+  10/15/24 ASCUS, HPV negative      Vitals:   Blood pressure 116/78, pulse (!) 103, height 1.549 m (5' 1\"), weight 86.6 kg (191 lb), not currently breastfeeding.    Procedure: Colposcopy with Biopsy and ECC    Indications: Persistent abnormal pap smears    Procedure Details:   Chaperone for Intimate Exam: Chaperone was present for entire exam, Chaperone Name: DARYL Muller    The patient was counseled on the procedure. Risks, benefits and alternatives were reviewed. The patient is aware that this is diagnostic and not curative and a second procedure may be needed. A consent was reviewed and obtained.    The patient was positioned comfortably on the exam table. A sterile speculum was placed into the vagina and the cervix was identified. The colposcope was positioned and the cervix was examined revealing no visible lesions other than a small nabothian cyst. Acetoacetic acid was applied to the cervix, revealing small area of acetowhite changes at 5 o'clock. Lugol's Iodine was applied to the cervix revealing  no additional changes.  The exam was considered to be satisfactory with the transformation zone visualized.     Biopsy taken at 5 o'clock. Endocervical curettage was then performed and cells collected with cytobrush. Monsel's was used for hemostasis. Good hemostasis was observed. Biopsy tissue was sent to pathology.     The patient tolerated the procedure well. Post procedure restrictions were reviewed

## 2024-12-07 LAB — SURGICAL PATHOLOGY REPORT: NORMAL

## 2024-12-16 RX ORDER — PROPRANOLOL HYDROCHLORIDE 10 MG/1
10 TABLET ORAL DAILY
Qty: 90 TABLET | Refills: 0 | Status: SHIPPED | OUTPATIENT
Start: 2024-12-16

## 2025-05-30 RX ORDER — PROPRANOLOL HYDROCHLORIDE 10 MG/1
10 TABLET ORAL DAILY
Qty: 90 TABLET | Refills: 0 | Status: SHIPPED | OUTPATIENT
Start: 2025-05-30